# Patient Record
Sex: MALE | Race: WHITE | NOT HISPANIC OR LATINO
[De-identification: names, ages, dates, MRNs, and addresses within clinical notes are randomized per-mention and may not be internally consistent; named-entity substitution may affect disease eponyms.]

---

## 2018-07-12 ENCOUNTER — LABORATORY RESULT (OUTPATIENT)
Age: 48
End: 2018-07-12

## 2018-07-12 ENCOUNTER — APPOINTMENT (OUTPATIENT)
Dept: RHEUMATOLOGY | Facility: CLINIC | Age: 48
End: 2018-07-12
Payer: MEDICAID

## 2018-07-12 VITALS
TEMPERATURE: 98.7 F | HEIGHT: 71 IN | DIASTOLIC BLOOD PRESSURE: 100 MMHG | RESPIRATION RATE: 20 BRPM | HEART RATE: 20 BPM | SYSTOLIC BLOOD PRESSURE: 140 MMHG | OXYGEN SATURATION: 98 % | WEIGHT: 210 LBS | BODY MASS INDEX: 29.4 KG/M2

## 2018-07-12 DIAGNOSIS — Z87.438 PERSONAL HISTORY OF OTHER DISEASES OF MALE GENITAL ORGANS: ICD-10-CM

## 2018-07-12 DIAGNOSIS — M19.012 PRIMARY OSTEOARTHRITIS, RIGHT SHOULDER: ICD-10-CM

## 2018-07-12 DIAGNOSIS — Z87.09 PERSONAL HISTORY OF OTHER DISEASES OF THE RESPIRATORY SYSTEM: ICD-10-CM

## 2018-07-12 DIAGNOSIS — M19.011 PRIMARY OSTEOARTHRITIS, RIGHT SHOULDER: ICD-10-CM

## 2018-07-12 DIAGNOSIS — H10.10 ALLERGIC RHINITIS, UNSPECIFIED: ICD-10-CM

## 2018-07-12 DIAGNOSIS — Z88.9 ALLERGY STATUS TO UNSPECIFIED DRUGS, MEDICAMENTS AND BIOLOGICAL SUBSTANCES: ICD-10-CM

## 2018-07-12 DIAGNOSIS — J30.9 ALLERGIC RHINITIS, UNSPECIFIED: ICD-10-CM

## 2018-07-12 PROCEDURE — 36415 COLL VENOUS BLD VENIPUNCTURE: CPT

## 2018-07-12 PROCEDURE — 99245 OFF/OP CONSLTJ NEW/EST HI 55: CPT | Mod: 25

## 2018-07-12 RX ORDER — FLUTICASONE PROPIONATE 50 MCG
50 SPRAY, SUSPENSION NASAL
Refills: 0 | Status: ACTIVE | COMMUNITY

## 2018-07-12 RX ORDER — TAMSULOSIN HCL 0.4 MG
0.4 CAPSULE ORAL
Refills: 0 | Status: ACTIVE | COMMUNITY

## 2018-07-12 RX ORDER — LEVOCETIRIZINE DIHYDROCHLORIDE 5 MG/1
5 TABLET, FILM COATED ORAL
Refills: 0 | Status: ACTIVE | COMMUNITY

## 2018-07-12 RX ORDER — MONTELUKAST SODIUM 10 MG/1
10 TABLET, FILM COATED ORAL
Refills: 0 | Status: ACTIVE | COMMUNITY

## 2018-08-21 ENCOUNTER — RESULT REVIEW (OUTPATIENT)
Age: 48
End: 2018-08-21

## 2019-01-24 LAB
ALBUMIN MFR SERPL ELPH: 62.7 %
ALBUMIN SERPL-MCNC: 4.8 G/DL
ALBUMIN/GLOB SERPL: 1.7 RATIO
ALPHA1 GLOB MFR SERPL ELPH: 3.8 %
ALPHA1 GLOB SERPL ELPH-MCNC: 0.3 G/DL
ALPHA2 GLOB MFR SERPL ELPH: 8.8 %
ALPHA2 GLOB SERPL ELPH-MCNC: 0.7 G/DL
B-GLOBULIN MFR SERPL ELPH: 10.8 %
B-GLOBULIN SERPL ELPH-MCNC: 0.8 G/DL
CCP AB SER IA-ACNC: <8 UNITS
COLLAGEN TYPE II: 10.2 EU/ML
DEPRECATED KAPPA LC FREE/LAMBDA SER: 0.67 RATIO
GAMMA GLOB FLD ELPH-MCNC: 1.1 G/DL
GAMMA GLOB MFR SERPL ELPH: 13.9 %
IGA SER QL IEP: 306 MG/DL
IGG SER QL IEP: 1115 MG/DL
IGM SER QL IEP: 254 MG/DL
INTERPRETATION SERPL IEP-IMP: NORMAL
KAPPA LC CSF-MCNC: 2.15 MG/DL
KAPPA LC SERPL-MCNC: 1.44 MG/DL
M PROTEIN SPEC IFE-MCNC: NORMAL
PROT SERPL-MCNC: 7.7 G/DL
PROT SERPL-MCNC: 7.7 G/DL
R RICKETTSI IGG CSF-ACNC: POSITIVE
R RICKETTSI IGM CSF-ACNC: 0.85 INDEX
RF+CCP IGG SER-IMP: NEGATIVE

## 2019-03-20 ENCOUNTER — APPOINTMENT (OUTPATIENT)
Dept: UROLOGY | Facility: CLINIC | Age: 49
End: 2019-03-20
Payer: MEDICAID

## 2019-03-20 VITALS
HEART RATE: 64 BPM | HEIGHT: 71 IN | BODY MASS INDEX: 30.1 KG/M2 | SYSTOLIC BLOOD PRESSURE: 156 MMHG | WEIGHT: 215 LBS | DIASTOLIC BLOOD PRESSURE: 91 MMHG

## 2019-03-20 DIAGNOSIS — Z80.8 FAMILY HISTORY OF MALIGNANT NEOPLASM OF OTHER ORGANS OR SYSTEMS: ICD-10-CM

## 2019-03-20 DIAGNOSIS — Z80.42 FAMILY HISTORY OF MALIGNANT NEOPLASM OF PROSTATE: ICD-10-CM

## 2019-03-20 DIAGNOSIS — R35.1 NOCTURIA: ICD-10-CM

## 2019-03-20 PROCEDURE — 99204 OFFICE O/P NEW MOD 45 MIN: CPT

## 2019-03-20 RX ORDER — OMEPRAZOLE 40 MG/1
40 CAPSULE, DELAYED RELEASE ORAL
Qty: 90 | Refills: 0 | Status: ACTIVE | COMMUNITY
Start: 2018-11-12

## 2019-03-20 RX ORDER — ALPRAZOLAM 0.5 MG/1
0.5 TABLET ORAL
Qty: 30 | Refills: 0 | Status: ACTIVE | COMMUNITY
Start: 2019-03-06

## 2019-03-20 RX ORDER — ATORVASTATIN CALCIUM 10 MG/1
10 TABLET, FILM COATED ORAL
Qty: 90 | Refills: 0 | Status: ACTIVE | COMMUNITY
Start: 2019-02-18

## 2019-03-20 NOTE — PHYSICAL EXAM
[General Appearance - Well Developed] : well developed [General Appearance - Well Nourished] : well nourished [Normal Appearance] : normal appearance [Well Groomed] : well groomed [General Appearance - In No Acute Distress] : no acute distress [Edema] : no peripheral edema [Respiration, Rhythm And Depth] : normal respiratory rhythm and effort [Exaggerated Use Of Accessory Muscles For Inspiration] : no accessory muscle use [Bowel Sounds] : normal bowel sounds [Auscultation Breath Sounds / Voice Sounds] : lungs were clear to auscultation bilaterally [Abdomen Soft] : soft [Abdomen Tenderness] : non-tender [Abdomen Mass (___ Cm)] : no abdominal mass palpated [Costovertebral Angle Tenderness] : no ~M costovertebral angle tenderness [Penis Abnormality] : normal circumcised penis [Urethral Meatus] : meatus normal [Scrotum] : the scrotum was normal [Urinary Bladder Findings] : the bladder was normal on palpation [Epididymis] : the epididymides were normal [Testes Mass (___cm)] : there were no testicular masses [Testes Tenderness] : no tenderness of the testes [Rectal Exam - Rectum] : digital rectal exam was normal [Anus Abnormality] : the anus and perineum were normal [No Prostate Nodules] : no prostate nodules [Prostate Tenderness] : the prostate was not tender [Prostate Size ___ gm] : prostate size [unfilled] gm [Normal Station and Gait] : the gait and station were normal for the patient's age [No Focal Deficits] : no focal deficits [] : no rash [Oriented To Time, Place, And Person] : oriented to person, place, and time [Affect] : the affect was normal [Mood] : the mood was normal [Not Anxious] : not anxious [No Palpable Adenopathy] : no palpable adenopathy [FreeTextEntry1] : S1-S2 normal without murmur rub or gallop.

## 2019-03-20 NOTE — HISTORY OF PRESENT ILLNESS
[FreeTextEntry1] : The patient is a 48-year-old gentleman who was seen in the office today for the above. He said that his previous PCP treated him with Flomax for years ago because of post void dribbling. He is still taking this medication. His daytime frequency is 3 times a day with nocturia x2-4 as noted above. He denies all other urological and constitutional symptomatology. He admits to drinking three 24 ounce cups of water after dinner. He said that he likes to "keep hydrated".\par \par Past Urological History:\par A few UTIs\par -passed kidney stone\par -non-GC urethritis\par \par Urological Family History:\par Father- of prostate cancer\par Mother-nephrolithiasis

## 2019-03-20 NOTE — REVIEW OF SYSTEMS
[see HPI] : see HPI [Negative] : Heme/Lymph [Wake up at night to urinate  How many times?  ___] : wakes up to urinate [unfilled] times during the night [Strong urge to urinate] : strong urge to urinate [Interrupted urine stream] : interrupted urine stream [Wait a long time to urinate] : waits a long time to urinate [Leakage of urine with urgency] : leakage of urine with urgency

## 2019-03-20 NOTE — ASSESSMENT
[FreeTextEntry1] : #1) nocturia x2-4\par Patient is drinking an excessive amount of water (72 ounces) after dinner which can easily be the cause of this symptom.\par He was advised to eliminate all fluids after dinner and track his nocturia.\par \par #2) mild BPH\par The prostate on ROGER is minimally enlarged and it was recommended that he discontinue the Flomax to determine if he develops any significant urinary symptoms.\par \par He'll return in 2 months for reevaluation.

## 2019-05-22 ENCOUNTER — APPOINTMENT (OUTPATIENT)
Dept: UROLOGY | Facility: CLINIC | Age: 49
End: 2019-05-22

## 2019-10-17 ENCOUNTER — APPOINTMENT (OUTPATIENT)
Dept: ORTHOPEDIC SURGERY | Facility: CLINIC | Age: 49
End: 2019-10-17

## 2019-10-21 ENCOUNTER — APPOINTMENT (OUTPATIENT)
Dept: ORTHOPEDIC SURGERY | Facility: CLINIC | Age: 49
End: 2019-10-21
Payer: MEDICAID

## 2019-10-21 VITALS
DIASTOLIC BLOOD PRESSURE: 106 MMHG | BODY MASS INDEX: 30.1 KG/M2 | WEIGHT: 215 LBS | HEIGHT: 71 IN | HEART RATE: 87 BPM | SYSTOLIC BLOOD PRESSURE: 165 MMHG

## 2019-10-21 DIAGNOSIS — Z72.89 OTHER PROBLEMS RELATED TO LIFESTYLE: ICD-10-CM

## 2019-10-21 DIAGNOSIS — Z87.39 PERSONAL HISTORY OF OTHER DISEASES OF THE MUSCULOSKELETAL SYSTEM AND CONNECTIVE TISSUE: ICD-10-CM

## 2019-10-21 DIAGNOSIS — Z78.9 OTHER SPECIFIED HEALTH STATUS: ICD-10-CM

## 2019-10-21 PROCEDURE — 72100 X-RAY EXAM L-S SPINE 2/3 VWS: CPT | Mod: TC

## 2019-10-21 PROCEDURE — 99204 OFFICE O/P NEW MOD 45 MIN: CPT

## 2019-10-21 RX ORDER — METHYLPREDNISOLONE 4 MG/1
4 TABLET ORAL
Qty: 1 | Refills: 0 | Status: ACTIVE | COMMUNITY
Start: 2019-10-21 | End: 1900-01-01

## 2019-10-21 RX ORDER — TRAZODONE HYDROCHLORIDE 50 MG/1
50 TABLET ORAL
Refills: 0 | Status: ACTIVE | COMMUNITY

## 2019-10-21 RX ORDER — NAPROXEN 500 MG/1
500 TABLET ORAL
Qty: 60 | Refills: 0 | Status: ACTIVE | COMMUNITY
Start: 2019-10-21 | End: 1900-01-01

## 2019-10-21 NOTE — HISTORY OF PRESENT ILLNESS
[de-identified] : 50yo M PMHx DIONTE PIERRE presents with complaints of lumbago for last 3 months.  He reports he lifted a golfcart and felt a popping sensation to his spine.  He has intermittent daily dull pain which worsens with mechanical movements, standing, bending, lifting.  He notes having left LE sciatica intermittently accompanied by numbness and tingling.  He reports his pain improves with rest, heat and NSAID.  He was seen at [facility] in July provided with a prednisone with minimal improvement of his symptoms. He is not enrolled in PT.  No current chiropractor care.  [Pain Location] : pain [6] : a current pain level of 6/10 [Stable] : stable [Intermit.] : ~He/She~ states the symptoms seem to be intermittent [Lifting] : worsened by lifting [Bending] : worsened by bending [Standing] : worsened by standing [Heat] : relieved by heat [NSAIDs] : relieved by nonsteroidal anti-inflammatory drugs [Rest] : relieved by rest [Ataxia] : ataxia [Incontinence] : incontinence [Loss of Dexterity] : loss of dexterity [Urinary Ret.] : urinary retention

## 2019-10-21 NOTE — PHYSICAL EXAM
[de-identified] : Lumbar xray with lumbar spondylosis and L5 inferior endplate limbus. [de-identified] : Spine: CONSTITUTIONAL: The patient is a very pleasant individual who is well-nourished and who appears stated age.\par \par PSYCHIATRIC: The patient is alert and oriented X 3 and in no apparent distress.\par HEENT: Atraumatic and is nonsyndromic in appearance.\par \par RESPIRATORY: Respiratory rate is regular, No MCCARTHY\par \par LYMPHATICS: There is no inguinal lymphadenopathy\par \par INTEGUMENTARY: Skin is clean, dry, and intact about the bilateral lower extremities and lumbar spine.\par \par VASCULAR: There is brisk capillary refill about the bilateral lower extremities and dorsalis pedis pulses are 2/4. \par \par NEUROLOGIC: There are no pathologic reflexes. There is no decrease in sensation of the bilateral lower extremities on Wartenberg pinwheel examination. Deep tendon reflexes are well maintained at 2+/4 of the bilateral lower extremities and are symmetric. \par \par MUSCULOSKELETAL: There is no visible muscular atrophy. Manual motor strength is well maintained in the bilateral lower extremities. Range of motion of lumbar spine is well maintained. The patient ambulates in a non-myelopathic manner. Negative tension sign and straight leg raise bilaterally. Quad extension, ankle dorsiflexion, EHL, plantar flexion, and ankle eversion are well preserved. Normal secondary orthopaedic exam of bilateral piriformis, hips, greater trochanters, knees. axial back pain reproducible with palpation. Multiple trigger points of pain to lumbar parspinals. \par

## 2019-10-21 NOTE — REVIEW OF SYSTEMS
[Joint Pain] : joint pain [Joint Stiffness] : joint stiffness [Feeling Tired] : fatigue [Joint Swelling] : joint swelling [Nasal Discharge] : nasal discharge [Diarrhea] : diarrhea [Depression] : depression [Anxiety] : anxiety [Sleep Disturbances] : ~T sleep disturbances [Easy Bruising] : a tendency for easy bruising [Negative] : Psychiatric

## 2019-10-21 NOTE — DISCUSSION/SUMMARY
[de-identified] : 49y M presents with Lumbar spondylosis and intermittent LLE sciatica.  Will initiate conservative management with physical therapy for core strengthening modalities, decreased pain modalities and medical massage. Patient was provided a prescription of Medrol dose pack, naproxen 500 p.o. q.12 p.r.n. pain. Discussed home exercise to strengthen core such as yoga Pilates swimming walking.  Pt had an MRI ordered by PCP but denied due to lack conservative management.  Pt will return to office in 4-6wks after PT if symptoms not improving will consider further imaging. \par

## 2019-11-18 ENCOUNTER — APPOINTMENT (OUTPATIENT)
Dept: ORTHOPEDIC SURGERY | Facility: CLINIC | Age: 49
End: 2019-11-18
Payer: MEDICAID

## 2019-11-18 VITALS
BODY MASS INDEX: 30.1 KG/M2 | WEIGHT: 215 LBS | SYSTOLIC BLOOD PRESSURE: 159 MMHG | DIASTOLIC BLOOD PRESSURE: 95 MMHG | HEART RATE: 109 BPM | HEIGHT: 71 IN

## 2019-11-18 PROCEDURE — 99213 OFFICE O/P EST LOW 20 MIN: CPT

## 2019-11-18 RX ORDER — MELOXICAM 15 MG/1
15 TABLET ORAL
Qty: 30 | Refills: 0 | Status: ACTIVE | COMMUNITY
Start: 2019-11-18 | End: 1900-01-01

## 2019-11-18 NOTE — PHYSICAL EXAM
[de-identified] :  CONSTITUTIONAL: The patient is a very pleasant individual who is well-nourished and who appears stated age.\par \par PSYCHIATRIC: The patient is alert and oriented X 3 and in no apparent distress.\par HEENT: Atraumatic and is nonsyndromic in appearance.\par \par RESPIRATORY: Respiratory rate is regular, No MCCARTHY\par \par LYMPHATICS: There is no inguinal lymphadenopathy\par \par INTEGUMENTARY: Skin is clean, dry, and intact about the bilateral lower extremities and lumbar spine.\par \par VASCULAR: There is brisk capillary refill about the bilateral lower extremities and dorsalis pedis pulses are 2/4. \par \par NEUROLOGIC: There are no pathologic reflexes. There is no decrease in sensation of the bilateral lower extremities on Wartenberg pinwheel examination. Deep tendon reflexes are well maintained at 2+/4 of the bilateral lower extremities and are symmetric. \par \par MUSCULOSKELETAL: There is no visible muscular atrophy. Manual motor strength is well maintained in the bilateral lower extremities. Range of motion of lumbar spine is well maintained. The patient ambulates in a non-myelopathic manner. Negative tension sign and straight leg raise bilaterally. Quad extension, ankle dorsiflexion, EHL, plantar flexion, and ankle eversion are well preserved. Normal secondary orthopaedic exam of bilateral piriformis, hips, greater trochanters, knees. axial back pain reproducible with palpation. M [de-identified] : No new imaging

## 2019-11-18 NOTE — HISTORY OF PRESENT ILLNESS
[de-identified] : 48yo M presents with complaints of lumbago which is worsening since last visit.   He reports daily dull pain which worsens with mechanical movements, standing, bending, lifting.  He still has persistent intermittent left LE sciatica  and numbness and tingling.  He reports his pain improves with rest, heat and NSAID.  He reports medrol dose pack did not provide any relief of his symptoms.  He states unable to start PT due to insurance.  [Pain Location] : pain [Worsening] : worsening [8] : a current pain level of 8/10 [Daily] : ~He/She~ states the symptoms seem to be occuring daily [Bending] : worsened by bending [Lifting] : worsened by lifting [NSAIDs] : relieved by nonsteroidal anti-inflammatory drugs [Rest] : relieved by rest [Urinary Ret.] : no urinary retention [Incontinence] : no incontinence

## 2019-11-18 NOTE — DISCUSSION/SUMMARY
[de-identified] : 49y M presents with Lumbar spondylosis with LLE sciatica. He can continue NSAID as needed.  Continue with conservative management. MRI of lumbar spine to evaluate for discogenic cause of LLE sciatica. f/u after MRI is complete.

## 2019-11-25 ENCOUNTER — APPOINTMENT (OUTPATIENT)
Dept: INTERNAL MEDICINE | Facility: CLINIC | Age: 49
End: 2019-11-25
Payer: MEDICAID

## 2019-11-25 VITALS
DIASTOLIC BLOOD PRESSURE: 80 MMHG | HEIGHT: 71 IN | BODY MASS INDEX: 29.54 KG/M2 | WEIGHT: 211 LBS | SYSTOLIC BLOOD PRESSURE: 120 MMHG

## 2019-11-25 DIAGNOSIS — A77.0 SPOTTED FEVER DUE TO RICKETTSIA RICKETTSII: ICD-10-CM

## 2019-11-25 PROCEDURE — 36415 COLL VENOUS BLD VENIPUNCTURE: CPT

## 2019-11-25 PROCEDURE — 99204 OFFICE O/P NEW MOD 45 MIN: CPT | Mod: 25

## 2019-11-25 RX ORDER — TAMSULOSIN HYDROCHLORIDE 0.4 MG/1
0.4 CAPSULE ORAL
Qty: 90 | Refills: 0 | Status: DISCONTINUED | COMMUNITY
Start: 2019-01-21 | End: 2019-11-25

## 2019-11-25 RX ORDER — LORATADINE 10 MG/1
10 TABLET ORAL
Qty: 30 | Refills: 0 | Status: DISCONTINUED | COMMUNITY
Start: 2018-11-12 | End: 2019-11-25

## 2019-11-25 RX ORDER — OMEPRAZOLE MAGNESIUM 20 MG/1
TABLET, DELAYED RELEASE ORAL
Refills: 0 | Status: DISCONTINUED | COMMUNITY
End: 2019-11-25

## 2019-11-25 RX ORDER — PREDNISONE 20 MG/1
20 TABLET ORAL
Qty: 10 | Refills: 0 | Status: DISCONTINUED | COMMUNITY
Start: 2018-12-22 | End: 2019-11-25

## 2019-11-25 RX ORDER — ALPRAZOLAM 2 MG/1
TABLET ORAL
Refills: 0 | Status: DISCONTINUED | COMMUNITY
End: 2019-11-25

## 2019-11-25 RX ORDER — RANITIDINE 150 MG/1
150 TABLET ORAL
Qty: 30 | Refills: 0 | Status: DISCONTINUED | COMMUNITY
Start: 2019-03-06 | End: 2019-11-25

## 2019-11-25 RX ORDER — AMOXICILLIN AND CLAVULANATE POTASSIUM 875; 125 MG/1; MG/1
875-125 TABLET, COATED ORAL
Qty: 20 | Refills: 0 | Status: DISCONTINUED | COMMUNITY
Start: 2018-12-22 | End: 2019-11-25

## 2019-11-25 NOTE — ASSESSMENT
[Treatment Education] : treatment education [FreeTextEntry1] : Patient has no evidence of any tick-related illness as it appears his pos RMSF serology absence of symptoms is consistent with probable disease in the past. Noted to the patient that it may remain positive indefinitely and this is reflective of old disease as it does not cause chronic illness. Patient's symptomatology is also not consistent with a recommend spotted fever as it does not cause chronic symptoms.\par It is unclear if the patient's fatigue is work related or whether he has occult sleep apnea and for that patient should probably repeat referred to pulmonary for a formal sleep study.\par Although patient is gamble he has not had any risky behavior but he is unaware of hep C status a repeat HIV testing and hep C testing will also be done.\par I reassured the patient has no evidence of any infectious disease and no further evaluation is necessary assuming blood work done today is negative\par I cautioned patient against routine ticc serology in the absence of symptoms [Treatment Adherence] : treatment adherence [Risk Reduction] : risk reduction [Rx Dose / Side Effects] : Rx dose/side effects [Drug Interactions / Side Effects] : drug interactions/side effects

## 2019-11-25 NOTE — ASSESSMENT
[Treatment Education] : treatment education [FreeTextEntry1] : Patient has no evidence of any tick-related illness as it appears his pos RMSF serology absence of symptoms is consistent with probable disease in the past. Noted to the patient that it may remain positive indefinitely and this is reflective of old disease as it does not cause chronic illness. 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

## 2019-11-25 NOTE — HISTORY OF PRESENT ILLNESS
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 Patient stated that for the last number years every time his chest and he has been positive. He has known memory of any acute febrile episode. Is from [place] currently lives in both [place] and [place]. He has no history of rashes headaches or febrile illnesses. When he was initially diagnosed based on positive serology in the setting of fatigue and achiness he was given a course of Vibramycin. Subsequently he has had blood test done multiple times still positive for atherosclerotic fever.\par He has no associated symptoms except for fatigue and nonspecific joint symptoms\par He is gamble but states he is essentially celibate although has a partner for the last 8 years. He states he used last time he was HIV tested was within the past year. He is unaware of hep C testing\par Patient comes a lab test that revealed negative serologies for tick-related illness as well as for rheumatoid arthritis.\par He is unaware of a history of snoring

## 2019-11-25 NOTE — REVIEW OF SYSTEMS
[As Noted in HPI] : as noted in HPI [Feeling Tired] : feeling tired [Joint Pain] : no joint pain [Negative] : Heme/Lymph

## 2019-11-25 NOTE — PHYSICAL EXAM
[General Appearance - In No Acute Distress] : in no acute distress [Sclera] : the sclera and conjunctiva were normal [General Appearance - Alert] : alert [Extraocular Movements] : extraocular movements were intact [PERRL With Normal Accommodation] : pupils were equal in size, round, reactive to light [Outer Ear] : the ears and nose were normal in appearance [Neck Cervical Mass (___cm)] : no neck mass was observed [Oropharynx] : the oropharynx was normal with no thrush [Neck Appearance] : the appearance of the neck was normal [Jugular Venous Distention Increased] : there was no jugular-venous distention [Auscultation Breath Sounds / Voice Sounds] : lungs were clear to auscultation bilaterally [Thyroid Diffuse Enlargement] : the thyroid was not enlarged [Heart Rate And Rhythm] : heart rate was normal and rhythm regular [Heart Sounds Gallop] : no gallops [Heart Sounds] : normal S1 and S2 [Heart Sounds Pericardial Friction Rub] : no pericardial rub [Murmurs] : no murmurs [Bowel Sounds] : normal bowel sounds [] : no hepato-splenomegaly [Abdomen Soft] : soft [Abdomen Tenderness] : non-tender [No Palpable Adenopathy] : no palpable adenopathy [Costovertebral Angle Tenderness] : no CVA tenderness [Abdomen Mass (___ Cm)] : no abdominal mass palpated [Musculoskeletal - Swelling] : no joint swelling [Oriented To Time, Place, And Person] : oriented to person, place, and time [Motor Tone] : muscle strength and tone were normal [Nail Clubbing] : no clubbing  or cyanosis of the fingernails [Affect] : the affect was normal

## 2019-11-25 NOTE — PHYSICAL EXAM
[General Appearance - Alert] : alert [Sclera] : the sclera and conjunctiva were normal [General Appearance - In No Acute Distress] : in no acute distress [Outer Ear] : the ears and nose were normal in appearance [Extraocular Movements] : extraocular movements were intact [PERRL With Normal Accommodation] : pupils were equal in size, round, reactive to light [Neck Appearance] : the appearance of the neck was normal [Jugular Venous Distention Increased] : there was no jugular-venous distention [Oropharynx] : the oropharynx was normal with no thrush [Neck Cervical Mass (___cm)] : no neck mass was observed [Thyroid Diffuse Enlargement] : the thyroid was not enlarged [Auscultation Breath Sounds / Voice Sounds] : lungs were clear to auscultation bilaterally [Heart Sounds] : normal S1 and S2 [Heart Sounds Gallop] : no gallops [Heart Rate And Rhythm] : heart rate was normal and rhythm regular [Bowel Sounds] : normal bowel sounds [Heart Sounds Pericardial Friction Rub] : no pericardial rub [Murmurs] : no murmurs [Abdomen Soft] : soft [Abdomen Tenderness] : non-tender [] : no hepato-splenomegaly [Costovertebral Angle Tenderness] : no CVA tenderness [No Palpable Adenopathy] : no palpable adenopathy [Abdomen Mass (___ Cm)] : no abdominal mass palpated [Motor Tone] : muscle strength and tone were normal [Oriented To Time, Place, And Person] : oriented to person, place, and time [Nail Clubbing] : no clubbing  or cyanosis of the fingernails [Musculoskeletal - Swelling] : no joint swelling [Affect] : the affect was normal

## 2019-11-25 NOTE — HISTORY OF PRESENT ILLNESS
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 Patient stated that for the last number years every time his chest and he has been positive. He has known memory of any acute febrile episode. Is from [place] currently lives in both [place]. He has no history of rashes headaches or febrile illnesses. When he was initially diagnosed based on positive serology in the setting of fatigue and achiness he was given a course of Vibramycin. Subsequently he has had blood test done multiple times still positive for atherosclerotic fever.\par He has no associated symptoms except for fatigue and nonspecific joint symptoms\par He is gamble but states he is essentially celibate although has a partner for the last 8 years. He states he used last time he was HIV tested was within the past year. He is unaware of hep C testing\par Patient comes a lab test that revealed negative serologies for tick-related illness as well as for rheumatoid arthritis.\par He is unaware of a history of snoring

## 2019-11-26 LAB
HCV AB SER QL: NONREACTIVE
HCV S/CO RATIO: 0.1 S/CO
HIV1+2 AB SPEC QL IA.RAPID: NONREACTIVE

## 2019-11-28 ENCOUNTER — FORM ENCOUNTER (OUTPATIENT)
Age: 49
End: 2019-11-28

## 2019-11-29 ENCOUNTER — OUTPATIENT (OUTPATIENT)
Dept: OUTPATIENT SERVICES | Facility: HOSPITAL | Age: 49
LOS: 1 days | End: 2019-11-29
Payer: MEDICAID

## 2019-11-29 ENCOUNTER — APPOINTMENT (OUTPATIENT)
Dept: MRI IMAGING | Facility: CLINIC | Age: 49
End: 2019-11-29
Payer: MEDICAID

## 2019-11-29 DIAGNOSIS — M54.16 RADICULOPATHY, LUMBAR REGION: ICD-10-CM

## 2019-11-29 PROCEDURE — 72148 MRI LUMBAR SPINE W/O DYE: CPT

## 2019-11-29 PROCEDURE — 72148 MRI LUMBAR SPINE W/O DYE: CPT | Mod: 26

## 2019-12-11 ENCOUNTER — OTHER (OUTPATIENT)
Age: 49
End: 2019-12-11

## 2020-03-12 ENCOUNTER — APPOINTMENT (OUTPATIENT)
Dept: RADIOLOGY | Facility: CLINIC | Age: 50
End: 2020-03-12
Payer: MEDICAID

## 2020-03-12 ENCOUNTER — OUTPATIENT (OUTPATIENT)
Dept: OUTPATIENT SERVICES | Facility: HOSPITAL | Age: 50
LOS: 1 days | End: 2020-03-12
Payer: MEDICAID

## 2020-03-12 DIAGNOSIS — Z00.8 ENCOUNTER FOR OTHER GENERAL EXAMINATION: ICD-10-CM

## 2020-03-12 PROCEDURE — 72040 X-RAY EXAM NECK SPINE 2-3 VW: CPT | Mod: 26

## 2020-03-12 PROCEDURE — 73030 X-RAY EXAM OF SHOULDER: CPT | Mod: 26,RT

## 2020-03-12 PROCEDURE — 73080 X-RAY EXAM OF ELBOW: CPT | Mod: 26,RT

## 2020-03-12 PROCEDURE — 73030 X-RAY EXAM OF SHOULDER: CPT

## 2020-03-12 PROCEDURE — 73080 X-RAY EXAM OF ELBOW: CPT

## 2020-03-12 PROCEDURE — 72040 X-RAY EXAM NECK SPINE 2-3 VW: CPT

## 2020-04-13 ENCOUNTER — APPOINTMENT (OUTPATIENT)
Dept: ORTHOPEDIC SURGERY | Facility: CLINIC | Age: 50
End: 2020-04-13

## 2020-05-04 ENCOUNTER — APPOINTMENT (OUTPATIENT)
Dept: ORTHOPEDIC SURGERY | Facility: CLINIC | Age: 50
End: 2020-05-04
Payer: MEDICAID

## 2020-05-04 VITALS
BODY MASS INDEX: 29.4 KG/M2 | HEIGHT: 71 IN | SYSTOLIC BLOOD PRESSURE: 159 MMHG | WEIGHT: 210 LBS | DIASTOLIC BLOOD PRESSURE: 113 MMHG | HEART RATE: 87 BPM

## 2020-05-04 PROCEDURE — 99214 OFFICE O/P EST MOD 30 MIN: CPT | Mod: 25

## 2020-05-04 PROCEDURE — 20610 DRAIN/INJ JOINT/BURSA W/O US: CPT | Mod: RT

## 2020-05-04 PROCEDURE — 73030 X-RAY EXAM OF SHOULDER: CPT | Mod: RT

## 2020-05-04 PROCEDURE — 73080 X-RAY EXAM OF ELBOW: CPT | Mod: RT

## 2020-05-04 RX ORDER — MELOXICAM 7.5 MG/1
7.5 TABLET ORAL
Qty: 21 | Refills: 0 | Status: COMPLETED | COMMUNITY
Start: 2020-05-04 | End: 2020-05-25

## 2020-05-04 NOTE — DISCUSSION/SUMMARY
[de-identified] : AMARIS is a 49 year old male who presents today for initial evaluation of right elbow secondary to lateral epicondylitis and acute onset right shoulder pain secondary to calcific tendinitis with strain to the cervical portion of his trapezius. \par \par In regards to his elbow, I discussed with the patient the treatment of lateral epicondylitis. I discussed that this is a degenerative condition rather than an inflammatory process. The process is reversible, and the best source of treatment is to reduce the offending repetitive overuse injury process. I counseled the patient how to do this. In addition, treatment includes counterforce bracing, physical therapy for stretching and strengthening, and the use of injection therapy (steroids/PRP etc). I also discussed the role of surgical intervention for may become a chronic condition. \par \par In regards to his shoulder, radiographs show a calcification at the rotator cuff insertion. Clinically, the patient specific point tenderness to this location as well as positive impingement signs. I discussed the pathophysiology of the diagnosis and the distinction between the resorptive and formative phase.  I discussed the treatment of calcific tendinitis with the patient at length today and the inflammatory process it incites during resorptive phase.\par \par At this time a continued trial of  conservative measures is indicated with use of a neoprene elbow compression band, formal physical therapy to both the right elbow and shoulder, and a prescription for meloxicam once daily, consecutively for 21 days, with food and alongside use of his Prilosec daily as previously advised. I counseled the patient on the side effects of meloxicam and the possible negative sequelae.. If he does develop any acid reflux or abdominal pain they should stop the medication and then call us. Due to his acute shoulder pain and calcium deposit on radiographs, pt elects for a cortisone injection that he tolerated well. He denies allergies or diabetes. We'll see him back in 6-8 weeks for clinical reassessment. If at that time he is doing well we will see him in the future on an prn basis. It was also recommended to him today to see a spine surgeon for his chronic back issues with associated radiculopathy. He agrees with the above plan all questions were answered.\par \par

## 2020-05-04 NOTE — HISTORY OF PRESENT ILLNESS
[5] : a current pain level of 5/10 [Heat] : relieved by heat [NSAIDs] : relieved by nonsteroidal anti-inflammatory drugs [Ice] : relieved by ice [Rest] : relieved by rest [4] : an average pain level of 4/10 [1] : a minimum pain level of 1/10 [7] : a maximum pain level of 7/10 [Lifting] : lifting [de-identified] : AMARIS is a 49 year old male who presents today for initial evaluation of right elbow and right shoulder pain.  The pain began December 2019.  It is sharp, burning, shooting, and stabbing in nature.  Patient admits to seeing a chiropractor but denies any other treatments. He admits to a fall catching himself with both arms extended in front of him summer 2019 but doesn’t admit to pain in his shoulder at that time. He also admits to "pulling his back out," over the summer lifting a golf cart. He has associated neck pain on the right side as well since then. He has seen chiropractor and acupuncture in the past with minimal relief but not a spine surgeon. PCP gave him a "powerful ibuprofen shot" that lasted one day. Ice/heat/ tylenol/ oral NSAIDs give no relief either. Pt admits to a hx of GERD that he takes Prilosec for with no symptoms with use of NSAIDs while on it.

## 2020-05-04 NOTE — PHYSICAL EXAM
[de-identified] : Physical Exam:\par General: Well appearing, no acute distress\par Neurologic: A&Ox3, No focal deficits\par Head: NCAT without abrasions, lacerations, or ecchymosis to head, face, or scalp\par Eyes: No scleral icterus, no gross abnormalities\par Respiratory: Equal chest wall expansion bilaterally, no accessory muscle use\par Lymphatic: No lymphadenopathy palpated\par Skin: Warm and dry\par Psychiatric: Normal mood and affect\par \par Upper trapezius and cervical paraspinal muscle spasm\par \par Right Shoulder\par ·	Inspection/Palpation: Tenderness to palpation over supraspinatus insertion, no crepitus, no deformities\par ·	Range of Motion: no crepitus with ROM; Active ; ER at side 35; IR to L1; Passive  with pain, ER at side 45, IR to T7\par ·	Strength: forward elevation in scapular plane [4/5], internal rotation [4/5], external rotation [4/5], adduction [4/5] and abduction [4/5]\par ·	Stability: no joint instability on provocative testing\par ·	Tests: Lao test positive, Neer positive, positive drop arm test secondary to pain, bear hug test positive, Napolean sign POS, cross arm adduction positive, lift off sign positive, hornblowers sign POS, speeds test negative, Yergason's test negative, Davidson's Active Compression test negative, whipple test positive, bicep's load II test negative\par \par Left Shoulder\par ·	Inspection/Palpation: no tenderness, swelling or deformities\par ·	Range of Motion: full and painless in all planes, no crepitus\par ·	Strength: forward elevation in scapular plane 5/5, internal rotation 5/5, external rotation 5/5, adduction 5/5 and abduction 5/5\par ·	Stability: no joint instability on provocative testing\par Tests: Lao test negative, Neer sign negative, negative drop arm test secondary to pain, bear hug test negative, Napolean sign negative, cross arm adduction negative, lift off sign positive, hornblowers sign negative, speeds test negative, Yergason's test negative, no bicipital groove tenderness, Davidson's Active Compression test negative\par \par Elbow Exam\par \par Skin: Clean, dry, intact. No ecchymosis. No swelling. No palpable joint effusion.\par ROM: RIGHT 0-130, full supination/pronation.  LEFT 0-140, full supination/pronation.\par Painful ROM: Pronation to lateral right elbow\par Tenderness: [No] medial epicondyle pain. Lateral epicondyle pain. [No] olecranon pain. No pain at radial head.\par Strength: 5/5 elbow flexion, 5/5 elbow extension, 5/5 supination, 5/5 pronation\par Stability: Stable to vaus/valgus stress\par Vasc: 2+ radial pulse, <2s cap refill\par Sensation: In tact to light touch throughout\par Neuro: Negative tinels at ulnar canal, AIN/PIN/Ulnar nerve in tact to motor/sensation.\par \par Special Tests:\par Dorsiflexion Against Resistance: Negative\par Flexion of Wrist Against Resistance: POS\par Resistance Against Pronation: POS\par Resistance Against Supination: Negative\par Tinel's Sign Cubital Tunnel: Negative [de-identified] : The following radiographs were ordered and read by me during this patients visit. I reviewed each radiograph in detail with the patient and discussed the findings as highlighted below. \par \par 4 views of the right shoulder shows calcium deposit at supraspinatus insertion, no fracture, dislocation or bony lesions. There is evidence of degenerative change in the glenohumeral and acromioclavicular joints. Otherwise unremarkable.\par \par 3 views of the right elbow were performed today and available for me to review. No fracture. No dislocation. No other deformities.

## 2020-05-04 NOTE — REVIEW OF SYSTEMS
[Joint Pain] : joint pain [Joint Stiffness] : joint stiffness [Recent Weight Gain (___ Lbs)] : recent ~M [unfilled] lbs weight gain [Depression] : depression [Anxiety] : anxiety [Sleep Disturbances] : ~T sleep disturbances [FreeTextEntry9] : right shoulder and elbow

## 2020-05-04 NOTE — PROCEDURE
[de-identified] : Injection: Right shoulder (Subacromial).\par Indication: Calcific Tendonitis\par \par A discussion was had with the patient regarding this procedure and all questions were answered. All risks, benefits and alternatives were discussed. These included but were not limited to bleeding, infection, and allergic reaction. Alcohol was used to clean the skin, and betadine was used to sterilize and prep the area in the posterior aspect of the right shoulder. Ethyl chloride spray was then used as a topical anesthetic. A 22-gauge needle was used to inject 3cc 1% xylocaine, 2cc 0.25% bupivacaine and 1cc of 40mg/mL triamcinolone acetonide into the right subacromial space. A sterile bandage was then applied. The patient tolerated the procedure well and there were no complications.\par

## 2020-05-15 ENCOUNTER — NON-APPOINTMENT (OUTPATIENT)
Age: 50
End: 2020-05-15

## 2020-09-03 ENCOUNTER — APPOINTMENT (OUTPATIENT)
Dept: ORTHOPEDIC SURGERY | Facility: CLINIC | Age: 50
End: 2020-09-03

## 2020-11-05 ENCOUNTER — APPOINTMENT (OUTPATIENT)
Dept: ORTHOPEDIC SURGERY | Facility: CLINIC | Age: 50
End: 2020-11-05
Payer: MEDICAID

## 2020-11-05 VITALS — TEMPERATURE: 97.8 F

## 2020-11-05 VITALS
BODY MASS INDEX: 27.3 KG/M2 | HEART RATE: 106 BPM | DIASTOLIC BLOOD PRESSURE: 93 MMHG | WEIGHT: 195 LBS | SYSTOLIC BLOOD PRESSURE: 142 MMHG | HEIGHT: 71 IN

## 2020-11-05 DIAGNOSIS — Z86.59 PERSONAL HISTORY OF OTHER MENTAL AND BEHAVIORAL DISORDERS: ICD-10-CM

## 2020-11-05 DIAGNOSIS — Z87.19 PERSONAL HISTORY OF OTHER DISEASES OF THE DIGESTIVE SYSTEM: ICD-10-CM

## 2020-11-05 DIAGNOSIS — M47.816 SPONDYLOSIS W/OUT MYELOPATHY OR RADICULOPATHY, LUMBAR REGION: ICD-10-CM

## 2020-11-05 DIAGNOSIS — Z87.39 PERSONAL HISTORY OF OTHER DISEASES OF THE MUSCULOSKELETAL SYSTEM AND CONNECTIVE TISSUE: ICD-10-CM

## 2020-11-05 DIAGNOSIS — Z86.39 PERSONAL HISTORY OF OTHER ENDOCRINE, NUTRITIONAL AND METABOLIC DISEASE: ICD-10-CM

## 2020-11-05 DIAGNOSIS — Z84.1 FAMILY HISTORY OF DISORDERS OF KIDNEY AND URETER: ICD-10-CM

## 2020-11-05 PROCEDURE — 72100 X-RAY EXAM L-S SPINE 2/3 VWS: CPT

## 2020-11-05 PROCEDURE — 99072 ADDL SUPL MATRL&STAF TM PHE: CPT

## 2020-11-05 PROCEDURE — 99214 OFFICE O/P EST MOD 30 MIN: CPT

## 2020-11-05 RX ORDER — METHYLPREDNISOLONE 4 MG/1
4 TABLET ORAL
Qty: 1 | Refills: 0 | Status: ACTIVE | COMMUNITY
Start: 2020-11-05 | End: 1900-01-01

## 2020-11-05 NOTE — HISTORY OF PRESENT ILLNESS
[de-identified] : 50 year old  M Presents for evaluation of lower back pain he describes as a constant dull pressure he isolates to the lower left lumbar spine, this pain radiates down his LLE. He attended PT for 2-3 months twice a week previously which he responded well to. Patient states he is a  on [place] and is there for long periods of time working most days so it is difficult for him to seek care consistently. He states he had an incident recently where he slipped on a board walk and landed flat on his back. BRANDY questionnaire negative.   [Ataxia] : no ataxia [Incontinence] : no incontinence [Loss of Dexterity] : good dexterity [Urinary Ret.] : no urinary retention

## 2020-11-05 NOTE — DISCUSSION/SUMMARY
[de-identified] : Patient was instructed to start home exercises, core strengthening and a sheet was provided. Patient has been referred to physical therapy for decreased pain modalities, core strengthening modalities, soft tissue modalities, and physical modalities. Patient has been referred to physiatry for assessment regarding injections to help manage pain. I will provide a Medrol dose pack for pain relief. Patient to follow up on a PRN basis.

## 2020-11-05 NOTE — ADDENDUM
[FreeTextEntry1] : Documented by Tyler Bobby acting as a scribe for Dr. Pro Vee on 11/05/2020. All medical record entries made by the Scribe were at my, Dr. Pro Vee, direction and personally dictated by me on 11/05/2020 . I have reviewed the chart and agree that the record accurately reflects my personal performance of the history, physical exam, assessment and plan. I have also personally directed, reviewed, and agreed with the chart.

## 2020-11-05 NOTE — PHYSICAL EXAM
[Poor Appearance] : well-appearing [Acute Distress] : not in acute distress [Obese] : not obese [de-identified] : CONSTITUTIONAL: The patient is a very pleasant individual who is well-nourished and who appears stated age.\par PSYCHIATRIC: The patient is alert and oriented X 3 and in no apparent distress, and participates with orthopedic evaluation well.\par HEAD: Atraumatic and is nonsyndromic in appearance.\par EENT: No visible thyromegaly, EOMI.\par RESPIRATORY: Respiratory rate is regular, not dyspneic on examination.\par LYMPHATICS: There is no inguinal lymphadenopathy\par INTEGUMENTARY: Skin is clean, dry, and intact about the bilateral lower extremities and lumbar spine.\par VASCULAR: There is brisk capillary refill about the bilateral lower extremities.\par NEUROLOGIC: There are no pathologic reflexes. Deep tendon reflexes are well maintained at 2+/4 of the bilateral lower extremities and are symmetric. Subjective complaints of LLE radiculopathy in an L4 distribution. \par MUSCULOSKELETAL: There is no visible muscular atrophy. Manual motor strength is well maintained in the bilateral lower extremities. Range of motion of lumbar spine is well maintained. The patient ambulates in a non-myelopathic manner. Negative tension sign and straight leg raise bilaterally. Quad extension, ankle dorsiflexion, EHL, plantar flexion, and ankle eversion are well preserved. Normal secondary orthopaedic exam of bilateral hips, greater trochanteric area, knees and ankles Trigger point finding in the left lower lumbar spine.  [de-identified] : MRI of the lumbar spine taken at [facility] on 11/29/2019  demonstrates age appropriate lumbar spondylosis, chronic findings at L5 inferiorly which may be consistent with a limbus \par \par Xray of the Lumbar spine taken today: AP projection shows pedicles are well visualized L1-L5, no rotoscoliosis, lateral projections show well maintained lumbar lordosis with no acute processes noted. Chronic change of the anterior inferior corner of L5. Mild age appropriate degenerative changes appreciated.

## 2021-07-20 ENCOUNTER — APPOINTMENT (OUTPATIENT)
Dept: ORTHOPEDIC SURGERY | Facility: CLINIC | Age: 51
End: 2021-07-20
Payer: MEDICAID

## 2021-08-05 ENCOUNTER — APPOINTMENT (OUTPATIENT)
Dept: ORTHOPEDIC SURGERY | Facility: CLINIC | Age: 51
End: 2021-08-05
Payer: MEDICAID

## 2021-08-05 ENCOUNTER — NON-APPOINTMENT (OUTPATIENT)
Age: 51
End: 2021-08-05

## 2021-08-05 PROCEDURE — 20610 DRAIN/INJ JOINT/BURSA W/O US: CPT | Mod: RT

## 2021-08-05 PROCEDURE — 73564 X-RAY EXAM KNEE 4 OR MORE: CPT | Mod: LT

## 2021-08-05 PROCEDURE — 99214 OFFICE O/P EST MOD 30 MIN: CPT | Mod: 25

## 2021-08-05 NOTE — HISTORY OF PRESENT ILLNESS
[Stable] : stable [___ mths] : [unfilled] month(s) ago [de-identified] : AMARIS FERNÁNDEZ is a 50 year male being seen for initial visit bilat knee pain, L > R. He states symptoms started in late may. He visited [facility] where they xray were normal. He reports most pain in lateral compartment of knee. Patient denies mechanical symptoms such as catching, locking or buckling of the knee. He denies pain or numbness radiating down to extremity.

## 2021-08-05 NOTE — DISCUSSION/SUMMARY
[de-identified] : AMARIS FERNÁNDEZ is a 50 year male being seen for initial visit bilat knee pain, R secondary to IT band tendinitis, and L secondary to compensatory pain. He states symptoms started in late may. He visited [facility] where they xray were normal. He reports most pain in lateral compartment of knee. Patient denies mechanical symptoms such as catching, locking or buckling of the knee. He denies pain or numbness radiating down to extremity. \par \par We had a thorough discussion regarding the nature of his pain, the pathophysiology, as well as all treatment options. Normal radiographs were performed and reviewed today. Based off of his symptoms of pain with weakness and no prior conservative measures tried, patient necessitates physical therapy, and cortisone injection. Pt due to his acute pain elected for a right knee corticosteroid injection at today's visit and tolerated the procedure well. He should take it easy for the next 2-3 days while icing the joint. Patient was given prescription of formal physical therapy that he will perform 2x/wk for 6-8 wks. Conservative measures of treatment include rest until asymptomatic, activity avoidance, NSAID's PRN, application to ice to the area 2-3x daily for 20 minutes, with gradual return to activities. Patient will follow up in 4-6 wks for repeat clinical assessment. If refractory, we will consider MRI to explore further treatment options.  All questions were answered and the patient verbalized understanding. The patient is in agreement with this treatment plan.  \par

## 2021-08-05 NOTE — PHYSICAL EXAM
[de-identified] : Right Knee: Range of Motion in Degrees	\par 	  Claimant:	Normal:	\par Flexion Active	 115 	 135-degrees	\par Flexion Passive	 120	 135-degrees	\par Extension Active	 0-5	 0-5-degrees	\par Extension Passive	 0-5	 0-5-degrees	\par \par Moderate effusion. IT band TTP. Lateral joint line TTP.  No weakness to extension. No evidence of instability in the AP plane or varus or valgus stress. Negative Lachman. Negative pivot shift. Negative anterior drawer test. Negative posterior drawer test. Negative Joanne. Negative Apley grind. No medial joint line tenderness. No tenderness over the medial and lateral facet of the patella. No patellofemoral crepitations. No lateral tilting patella. No patellar apprehension. No crepitation in the medial and lateral femoral condyle. No proximal or distal swelling, edema or tenderness. No gross motor or sensory deficits. No intra-articular swelling. 2+ DP and PT pulses. No varus or valgus malalignment. Skin is intact. No rashes, scars or lesions.\par \par Left Knee: Range of Motion in Degrees	\par 	  Claimant:	Normal:	\par Flexion Active	 135 	 135-degrees	\par Flexion Passive	 135	 135-degrees	\par Extension Active	 0-5	 0-5-degrees	\par Extension Passive	 0-5	 0-5-degrees	\par \par No weakness to flexion/extension. No evidence of instability in the AP plane or varus or valgus stress. Negative Lachman. Negative pivot shift. Negative anterior drawer test. Negative posterior drawer test. Negative Joanne. Negative Apley grind. No medial or lateral joint line tenderness. No tenderness over the medial and lateral facet of the patella. patellofemoral crepitations. No lateral tilting patella. No patellar apprehension. No crepitation in the medial and lateral femoral condyle. No proximal or distal swelling, edema or tenderness. No gross motor or sensory deficits. No intra-articular swelling. 2+ DP and PT pulses. No varus or valgus malalignment. Skin is intact. No rashes, scars or lesions.  [de-identified] : The following radiographs were ordered and read by me during this patients visit. I reviewed each radiograph in detail with the patient and discussed the findings as highlighted below. \par \par 4 views of the Left knee show no fracture, dislocation or bony lesions. There is no evidence of degenerative change in the medial, lateral and patellofemoral compartments with maintenance of the joint space. Otherwise unremarkable.

## 2021-08-05 NOTE — ADDENDUM
[FreeTextEntry1] : Documented by Curry Jang acting as a scribe for Dr. Carmona on 08/05/2021. \par \par All medical record entries made by the Scribe were at my, Dr. Carmona's, direction and\par personally dictated by me on 08/05/2021. I have reviewed the chart and agree that the record\par accurately reflects my personal performance of the history, physical exam, procedure and imaging.

## 2021-08-05 NOTE — PROCEDURE
[de-identified] : Injection: Right knee joint.\par Indication: pain/effusion\par \par A discussion was had with the patient regarding this procedure and all questions were answered. All risks, benefits and alternatives were discussed. These included but were not limited to bleeding, infection, and allergic reaction. Alcohol was used to clean the skin, and betadine was used to sterilize and prep the area in the supero-lateral aspect of the right knee. Ethyl chloride spray was then used as a topical anesthetic. A 22-gauge needle was used to inject 3cc of 1% Xylocaine, 2cc of 0.25% Bupivacaine and 1cc of 40mg/mL Triamcinolone Acetonide into the right knee. A sterile bandage was then applied. The patient tolerated the procedure well and there were no complications

## 2021-11-30 ENCOUNTER — APPOINTMENT (OUTPATIENT)
Dept: ORTHOPEDIC SURGERY | Facility: CLINIC | Age: 51
End: 2021-11-30
Payer: MEDICAID

## 2021-11-30 VITALS
HEIGHT: 71 IN | BODY MASS INDEX: 27.3 KG/M2 | HEART RATE: 92 BPM | SYSTOLIC BLOOD PRESSURE: 159 MMHG | DIASTOLIC BLOOD PRESSURE: 99 MMHG | WEIGHT: 195 LBS

## 2021-11-30 DIAGNOSIS — M76.31 ILIOTIBIAL BAND SYNDROME, RIGHT LEG: ICD-10-CM

## 2021-11-30 PROCEDURE — 73030 X-RAY EXAM OF SHOULDER: CPT | Mod: RT

## 2021-11-30 PROCEDURE — 20610 DRAIN/INJ JOINT/BURSA W/O US: CPT | Mod: RT

## 2021-11-30 PROCEDURE — 99214 OFFICE O/P EST MOD 30 MIN: CPT | Mod: 25

## 2021-12-01 PROBLEM — M76.31 ILIOTIBIAL BAND TENDINITIS OF RIGHT SIDE: Status: ACTIVE | Noted: 2021-08-05

## 2021-12-01 NOTE — DISCUSSION/SUMMARY
[de-identified] : AMARIS is a 49 year old male who presents today for f/u evaluation right shoulder pain.   It is sharp, burning, shooting, and stabbing in nature.  Patient admits to seeing a chiropractor but denies any other treatments. He received cortisone injection about a yr ago, that was providing relief up until recently. He presents today requesting another cortisone injection. \par \par The patient presents today with acute onset of Right shoulder pain consistent with a clinical diagnosis of calcific tendinopathy. Radiographs show a calcification at the rotator cuff insertion. Clinically, the patient specific point tenderness to this location as well as positive impingement signs. I discussed the pathophysiology of the diagnosis and the distinction between the resorptive and formative phase.  I discussed the treatment of calcific tendinitis with the patient at length today and the inflammatory process it incites during resorptive phase.\par \par I discussed the treatment of calcific tendinitis with the patient including nonoperative management as first line treatment. Anti-inflammatory medications (NSAID's) with physical therapy for strengthening and conditioning of the joint to preserve shoulder range of motion is typically required. Corticosteroid injection and/or US guided percutaneous aspiration and lavage may be indicated for refractory cases and for acute symptomatic pain relief. If nonoperative management fails, arthroscopic debridement with possible rotator cuff repair if damaged during surgery may be required for recalcitrant cases.\par \par The patient elected to proceed with nonoperative management including a cortisone injection performed today that he tolerated well alongside starting HEP. A home exercise sheet was provided and discussed with patient to follow. We will see them again for clinical reassessment in 6-8 weeks. He agrees with the above plan and all questions were answered.

## 2021-12-01 NOTE — ADDENDUM
[FreeTextEntry1] : Documented by Curry Jang acting as a scribe for Dr. Carmona on 11/30/2021. \par \par All medical record entries made by the Scribe were at my, Dr. Carmona's, direction and\par personally dictated by me on 11/30/2021. I have reviewed the chart and agree that the record\par accurately reflects my personal performance of the history, physical exam, procedure and imaging.

## 2021-12-01 NOTE — HISTORY OF PRESENT ILLNESS
[Stable] : stable [___ yrs] : [unfilled] year(s) ago [3] : a current pain level of 3/10 [4] : an average pain level of 4/10 [1] : a minimum pain level of 1/10 [5] : a maximum pain level of 5/10 [Lifting] : worsened by lifting [Heat] : relieved by heat [Ice] : relieved by ice [NSAIDs] : relieved by nonsteroidal anti-inflammatory drugs [Rest] : relieved by rest [de-identified] : AMARIS is a 49 year old male who presents today for f/u evaluation right shoulder pain.   It is sharp, burning, shooting, and stabbing in nature.  Patient admits to seeing a chiropractor but denies any other treatments. He received cortisone injection about a yr ago, that was providing relief up until recently. He presents today requesting another cortisone injection.

## 2021-12-01 NOTE — PROCEDURE
[de-identified] : Injection: Right shoulder (Subacromial). \par Indication: Calcific tendinitis \par \par A discussion was had with the patient regarding this procedure and all questions were answered. All risks, benefits and alternatives were discussed. These included but were not limited to bleeding, infection, and allergic reaction. Alcohol was used to clean the skin, and betadine was used to sterilize and prep the area in the posterior aspect of the right shoulder. Ethyl chloride spray was then used as a topical anesthetic. A 22-gauge needle was used to inject 3cc 1% xylocaine, 2cc 0.25% bupivacaine and 1cc of 40mg/mL triamcinolone acetonide into the right subacromial space. A sterile bandage was then applied. The patient tolerated the procedure well and there were no complications.

## 2021-12-01 NOTE — PHYSICAL EXAM
[de-identified] : Physical Exam:\par General: Well appearing, no acute distress\par Neurologic: A&Ox3, No focal deficits\par Head: NCAT without abrasions, lacerations, or ecchymosis to head, face, or scalp\par Eyes: No scleral icterus, no gross abnormalities\par Respiratory: Equal chest wall expansion bilaterally, no accessory muscle use\par Lymphatic: No lymphadenopathy palpated\par Skin: Warm and dry\par Psychiatric: Normal mood and affect\par \par Upper trapezius and cervical paraspinal muscle spasm\par \par Right Shoulder\par ·	Inspection/Palpation: Tenderness to palpation over supraspinatus insertion, no crepitus, no deformities\par ·	Range of Motion: no crepitus with ROM; Active ; ER at side 35; IR to L1; Passive  with pain, ER at side 45, IR to T7\par ·	Strength: forward elevation in scapular plane [4/5], internal rotation [4/5], external rotation [4/5], adduction [4/5] and abduction [4/5]\par ·	Stability: no joint instability on provocative testing\par ·	Tests: Lao test positive, Neer positive, positive drop arm test secondary to pain, bear hug test positive, Napolean sign POS, cross arm adduction positive, lift off sign positive, hornblowers sign POS, speeds test negative, Yergason's test negative, Davidson's Active Compression test negative, whipple test positive, bicep's load II test negative\par \par Left Shoulder\par ·	Inspection/Palpation: no tenderness, swelling or deformities\par ·	Range of Motion: full and painless in all planes, no crepitus\par ·	Strength: forward elevation in scapular plane 5/5, internal rotation 5/5, external rotation 5/5, adduction 5/5 and abduction 5/5\par ·	Stability: no joint instability on provocative testing\par Tests: Lao test negative, Neer sign negative, negative drop arm test secondary to pain, bear hug test negative, Napolean sign negative, cross arm adduction negative, lift off sign positive, hornblowers sign negative, speeds test negative, Yergason's test negative, no bicipital groove tenderness, Davidson's Active Compression test negative\par \par Elbow Exam\par \par Skin: Clean, dry, intact. No ecchymosis. No swelling. No palpable joint effusion.\par ROM: RIGHT 0-130, full supination/pronation.  LEFT 0-140, full supination/pronation.\par Painful ROM: Pronation to lateral right elbow\par Tenderness: [No] medial epicondyle pain. Lateral epicondyle pain. [No] olecranon pain. No pain at radial head.\par Strength: 5/5 elbow flexion, 5/5 elbow extension, 5/5 supination, 5/5 pronation\par Stability: Stable to vaus/valgus stress\par Vasc: 2+ radial pulse, <2s cap refill\par Sensation: In tact to light touch throughout\par Neuro: Negative tinels at ulnar canal, AIN/PIN/Ulnar nerve in tact to motor/sensation.\par \par Special Tests:\par Dorsiflexion Against Resistance: Negative\par Flexion of Wrist Against Resistance: POS\par Resistance Against Pronation: POS\par Resistance Against Supination: Negative\par Tinel's Sign Cubital Tunnel: Negative [de-identified] : The following radiographs were ordered and read by me during this patients visit. I reviewed each radiograph in detail with the patient and discussed the findings as highlighted below. \par \par 4 views of the right shoulder shows calcium deposit at supraspinatus insertion, no fracture, dislocation or bony lesions. There is evidence of degenerative change in the glenohumeral and acromioclavicular joints. Otherwise unremarkable.\par

## 2022-04-05 ENCOUNTER — APPOINTMENT (OUTPATIENT)
Age: 52
End: 2022-04-05
Payer: MEDICAID

## 2022-04-05 PROCEDURE — 99214 OFFICE O/P EST MOD 30 MIN: CPT

## 2022-04-07 NOTE — HISTORY OF PRESENT ILLNESS
[Stable] : stable [___ yrs] : [unfilled] year(s) ago [3] : a current pain level of 3/10 [4] : an average pain level of 4/10 [1] : a minimum pain level of 1/10 [5] : a maximum pain level of 5/10 [Lifting] : worsened by lifting [Heat] : relieved by heat [Ice] : relieved by ice [NSAIDs] : relieved by nonsteroidal anti-inflammatory drugs [Rest] : relieved by rest [de-identified] : AMARIS is a 49 year old male who presents today for f/u evaluation right shoulder calcific tendonitis. Pt reports two injections in the past which offered improvement. Last one in November offered less improvement. Works as a . Denies parasthesias.

## 2022-04-07 NOTE — DISCUSSION/SUMMARY
[de-identified] : I had a lengthy discussion with the patient regarding their current condition. We discussed the treatment options including operative and nonoperative management. At this time I recommended an MRI of his right shoulder. We discussed barbatoge vs arthroscopy with debridement and possible RTC repair. We will make further recs following the completion of his MRI. \par

## 2022-04-07 NOTE — PHYSICAL EXAM
[de-identified] : Physical Exam:\par General: Well appearing, no acute distress\par Neurologic: A&Ox3, No focal deficits\par Head: NCAT without abrasions, lacerations, or ecchymosis to head, face, or scalp\par Eyes: No scleral icterus, no gross abnormalities\par Respiratory: Equal chest wall expansion bilaterally, no accessory muscle use\par Lymphatic: No lymphadenopathy palpated\par Skin: Warm and dry\par Psychiatric: Normal mood and affect\par \par Upper trapezius and cervical paraspinal muscle spasm\par \par Right Shoulder\par ·	Inspection/Palpation: Tenderness to palpation over supraspinatus insertion, no crepitus, no deformities\par ·	Range of Motion: no crepitus with ROM; Active ; ER at side 35; IR to L1; Passive  with pain, ER at side 45, IR to T7\par ·	Strength: forward elevation in scapular plane [4/5], internal rotation [4/5], external rotation [4/5], adduction [4/5] and abduction [4/5]\par ·	Stability: no joint instability on provocative testing\par ·	Tests: Lao test positive, Neer positive, positive drop arm test secondary to pain, bear hug test positive, Napolean sign POS, cross arm adduction positive, lift off sign positive, hornblowers sign POS, speeds test negative, Yergason's test negative, Davidson's Active Compression test negative, whipple test positive, bicep's load II test negative\par \par Left Shoulder\par ·	Inspection/Palpation: no tenderness, swelling or deformities\par ·	Range of Motion: full and painless in all planes, no crepitus\par ·	Strength: forward elevation in scapular plane 5/5, internal rotation 5/5, external rotation 5/5, adduction 5/5 and abduction 5/5\par ·	Stability: no joint instability on provocative testing\par Tests: Lao test negative, Neer sign negative, negative drop arm test secondary to pain, bear hug test negative, Napolean sign negative, cross arm adduction negative, lift off sign positive, hornblowers sign negative, speeds test negative, Yergason's test negative, no bicipital groove tenderness, Davidson's Active Compression test negative\par \par Elbow Exam\par \par Skin: Clean, dry, intact. No ecchymosis. No swelling. No palpable joint effusion.\par ROM: RIGHT 0-130, full supination/pronation.  LEFT 0-140, full supination/pronation.\par Painful ROM: Pronation to lateral right elbow\par Tenderness: [No] medial epicondyle pain. Lateral epicondyle pain. [No] olecranon pain. No pain at radial head.\par Strength: 5/5 elbow flexion, 5/5 elbow extension, 5/5 supination, 5/5 pronation\par Stability: Stable to vaus/valgus stress\par Vasc: 2+ radial pulse, <2s cap refill\par Sensation: In tact to light touch throughout\par Neuro: Negative tinels at ulnar canal, AIN/PIN/Ulnar nerve in tact to motor/sensation.\par \par Special Tests:\par Dorsiflexion Against Resistance: Negative\par Flexion of Wrist Against Resistance: POS\par Resistance Against Pronation: POS\par Resistance Against Supination: Negative\par Tinel's Sign Cubital Tunnel: Negative

## 2022-04-11 ENCOUNTER — OUTPATIENT (OUTPATIENT)
Dept: OUTPATIENT SERVICES | Facility: HOSPITAL | Age: 52
LOS: 1 days | End: 2022-04-11

## 2022-04-11 ENCOUNTER — APPOINTMENT (OUTPATIENT)
Dept: MRI IMAGING | Facility: CLINIC | Age: 52
End: 2022-04-11
Payer: MEDICAID

## 2022-04-11 DIAGNOSIS — M75.31 CALCIFIC TENDINITIS OF RIGHT SHOULDER: ICD-10-CM

## 2022-04-11 PROCEDURE — 73221 MRI JOINT UPR EXTREM W/O DYE: CPT | Mod: 26,RT

## 2022-04-14 ENCOUNTER — APPOINTMENT (OUTPATIENT)
Dept: ORTHOPEDIC SURGERY | Facility: CLINIC | Age: 52
End: 2022-04-14
Payer: MEDICAID

## 2022-04-14 PROCEDURE — 99442: CPT

## 2022-04-25 ENCOUNTER — APPOINTMENT (OUTPATIENT)
Dept: ORTHOPEDIC SURGERY | Facility: CLINIC | Age: 52
End: 2022-04-25
Payer: MEDICAID

## 2022-04-25 PROCEDURE — 99214 OFFICE O/P EST MOD 30 MIN: CPT | Mod: 25

## 2022-04-25 PROCEDURE — 20611 DRAIN/INJ JOINT/BURSA W/US: CPT | Mod: RT

## 2022-04-25 NOTE — PHYSICAL EXAM
[de-identified] : Physical Exam: \par General: Well appearing, no acute distress \par Neurologic: A&Ox3, No focal deficits \par Head: NCAT without abrasions, lacerations, or ecchymosis to head, face, or scalp \par Eyes: No scleral icterus, no gross abnormalities \par Respiratory: Equal chest wall expansion bilaterally, no accessory muscle use \par Lymphatic: No lymphadenopathy palpated \par Skin: Warm and dry \par Psychiatric: Normal mood and affect\par \par Examination of the Right shoulder shows no obvious deformity, swelling or erythema. Mild tenderness to palpation over the anterior shoulder. No AC joint tenderness. The patient demonstrates active/passive ROM of Forward Flexion to 165 degrees, External Rotation to 40 degrees and Internal Rotation to a mid lumbar level. The patient has a positive Lao and Neers test. No pain with cross body adduction, lift off testing, AC compression testing or Yergason testing. The patient has 4/5 strength to forward flexion with pronation, internal and external rotation. Compartments are soft and nontender. The patient has 2+ cap refill and sensation is intact in the hand. \par \par Left shoulder shows no deformity. No tenderness to palpation over the biceps or AC joint. The patient has Forward Flexion to 170 degrees, External Rotation to 45 degrees and Internal Rotation to a mid lumbar level. 5/5 strength to forward flexion with pronation, internal and external rotation. Compartments are soft and nontender. 2+ cap refill. Sensation intact distally.\par  [de-identified] :  EXAM: [id number] - MR SHOULDER RT  - ORDERED BY: CHACHA NEAL\par PROCEDURE DATE:  04/11/2022\par  \par Calcific tendinosis involving the anterior insertional fibers of supraspinatus tendon with trace peritendinous edema and overlying bursitis. Adjacent mild to moderate bursal surface fraying along the anterior/mid fibers of supraspinatus tendon without full-thickness or retracted tear.\par \par Findings suggestive of extra articular biceps tenosynovitis.\par \par Mild glenohumeral arthrosis.\par \par Moderate acromioclavicular joint arthrosis.\par

## 2022-04-25 NOTE — DISCUSSION/SUMMARY
[de-identified] : I had a lengthy discussion with the patient regarding their current condition. We discussed the treatment options including operative and nonoperative management. At this time I recommended a barbotage procedure. He would like to proceed after the risks and benefits were discussed with him. He has Thera-Band and will perform a HEP. He will f/u as needed. \par

## 2022-04-25 NOTE — HISTORY OF PRESENT ILLNESS
[de-identified] : AMARIS FERNÁNDEZ is a 51 year male being seen for f/u visit R shoulder pain. He has had persistent pain. He has had two cortisone injections in the past, the last one at least 6 months ago. He works as a . He reviewed his MRI by phone with Dr. Carmona. Patient presents for evaluation for possible US-guided perc lavage of R shoulder calcium deposit.

## 2022-04-25 NOTE — PROCEDURE
[de-identified] : Injection: US guided Right shoulder barbotage procedure and subacromial cortisone injection.    \par \par A discussion was had with the patient regarding this procedure and all questions were answered. All risks, benefits and alternatives were discussed. These included but were not limited to bleeding, infection, and allergic reaction. Ultrasound was utilized to confirm the anatomic location of the calcification. Alcohol was used to clean the skin, and ChloraPrep was used to sterilize and prep the area in the lateral aspect of the right shoulder. Ethyl chloride spray was then used as a topical anesthetic. A 22-gauge needle was used to inject 3cc 1% lidocaine into the subcutaneous tissue. After adequate analgesia was obtained the calcification was again localized. The injection site was again sterilized as above in a similar fashion. Using an 18 gauge needle the calcification was lavaged with 10ml of 1% lidocaine and aspirated. This yielded a small amount of calcific deposit. Switching syringes the subacromial space was then injected with a mixture of 2cc of 1% lidocaine, 2cc 0.25% bupivacaine and 1cc of 40mg/mL triamcinolone acetonide. A sterile band aid was then applied. The patient tolerated the procedure well and there were no complications. Post injection instructions were given.\par

## 2022-08-25 ENCOUNTER — APPOINTMENT (OUTPATIENT)
Dept: ORTHOPEDIC SURGERY | Facility: CLINIC | Age: 52
End: 2022-08-25

## 2022-08-25 VITALS
BODY MASS INDEX: 26.88 KG/M2 | DIASTOLIC BLOOD PRESSURE: 82 MMHG | HEIGHT: 71 IN | WEIGHT: 192 LBS | SYSTOLIC BLOOD PRESSURE: 144 MMHG | OXYGEN SATURATION: 99 % | HEART RATE: 82 BPM

## 2022-08-25 DIAGNOSIS — S16.1XXA STRAIN OF MUSCLE, FASCIA AND TENDON AT NECK LEVEL, INITIAL ENCOUNTER: ICD-10-CM

## 2022-08-25 DIAGNOSIS — Z78.9 OTHER SPECIFIED HEALTH STATUS: ICD-10-CM

## 2022-08-25 DIAGNOSIS — M62.830 MUSCLE SPASM OF BACK: ICD-10-CM

## 2022-08-25 PROCEDURE — 73030 X-RAY EXAM OF SHOULDER: CPT | Mod: RT

## 2022-08-25 PROCEDURE — 99214 OFFICE O/P EST MOD 30 MIN: CPT

## 2022-08-27 PROBLEM — S16.1XXA STRAIN OF CERVICAL PORTION OF RIGHT TRAPEZIUS MUSCLE: Status: ACTIVE | Noted: 2020-05-04

## 2022-08-27 PROBLEM — M62.830 PARASPINAL MUSCLE SPASM: Status: ACTIVE | Noted: 2020-05-04

## 2022-10-06 PROBLEM — Z78.9 NON-SMOKER: Status: ACTIVE | Noted: 2019-10-21

## 2022-10-09 NOTE — DISCUSSION/SUMMARY
[de-identified] : AMARIS is a 51 year male here today for follow up on right shoulder pain. He received 3 cortisone injections in the past, but the pain has returned. He reports worsening shoulder pain. Patient presents to discuss surgical intervention. \par \par We had a thorough discussion regarding the nature of his pain, the pathophysiology, as well as all treatment options. Patient has tried and failed all conservative treatment options including the activity modification, HEP, PT, cortisone injections and NSAIDs. Patient is considering right shoulder arthroscopy with possible RTC repair, SAD, acromioplasty and extensive debridement. The patient understands the most common risks include infection, allergy to the anesthetic and stiffness of the shoulder. The risks are accepted. The patient was given no assurances that all the symptoms will be alleviated. A packet with surgical information was provided. I had my surgical coordinator Tiago meet with her to discuss dates. A prescription of Meloxicam was given to be taken as directed with food to prevent GI upset, if occurs pt to D/C and call us at that time. All questions were answered and the patient verbalized understanding. The patient is in agreement with this treatment plan.

## 2022-10-09 NOTE — PHYSICAL EXAM
[de-identified] : Physical Exam: \par General: Well appearing, no acute distress \par Neurologic: A&Ox3, No focal deficits \par Head: NCAT without abrasions, lacerations, or ecchymosis to head, face, or scalp \par Eyes: No scleral icterus, no gross abnormalities \par Respiratory: Equal chest wall expansion bilaterally, no accessory muscle use \par Lymphatic: No lymphadenopathy palpated \par Skin: Warm and dry \par Psychiatric: Normal mood and affect\par \par Examination of the Right shoulder shows no obvious deformity, swelling or erythema. Mild tenderness to palpation over the anterior shoulder. No AC joint tenderness. The patient demonstrates active/passive ROM of Forward Flexion to 165 degrees, External Rotation to 40 degrees and Internal Rotation to a mid lumbar level. The patient has a positive Lao and Neers test. No pain with cross body adduction, lift off testing, AC compression testing or Yergason testing. The patient has 4/5 strength to forward flexion with pronation, internal and external rotation. Compartments are soft and nontender. The patient has 2+ cap refill and sensation is intact in the hand. \par \par Left shoulder shows no deformity. No tenderness to palpation over the biceps or AC joint. The patient has Forward Flexion to 170 degrees, External Rotation to 45 degrees and Internal Rotation to a mid lumbar level. 5/5 strength to forward flexion with pronation, internal and external rotation. Compartments are soft and nontender. 2+ cap refill. Sensation intact distally.\par  [de-identified] :  EXAM: [id number] - MR SHOULDER RT  - ORDERED BY: CHACHA NEAL\par PROCEDURE DATE:  04/11/2022\par  \par Calcific tendinosis involving the anterior insertional fibers of supraspinatus tendon with trace peritendinous edema and overlying bursitis. Adjacent mild to moderate bursal surface fraying along the anterior/mid fibers of supraspinatus tendon without full-thickness or retracted tear.\par \par Findings suggestive of extra articular biceps tenosynovitis.\par \par Mild glenohumeral arthrosis.\par \par Moderate acromioclavicular joint arthrosis.\par \par 4 views of the right shoulder were performed today available to me to review.  They demonstrate callus deposit over the supraspinatus insertion.  No other fracture no degenerative changes no deformities.\par

## 2022-10-09 NOTE — ADDENDUM
[FreeTextEntry1] : Documented by Daphney White acting as a scribe for Dr. Carmona and Roberto Cruz PA-C on 08/25/2022. \par \par All medical record entries made by the Scribe were at my, Dr. Carmona, and Roberto Cruz's, direction and\par personally dictated by me on 08/25/2022. I have reviewed the chart and agree that the record\par accurately reflects my personal performance of the history, physical exam, procedure and imaging.

## 2022-10-09 NOTE — HISTORY OF PRESENT ILLNESS
[de-identified] : AMARIS is a 51 year male here today for follow up on right shoulder pain. He received 3 cortisone injections in the past, but the pain has returned. He reports worsening shoulder pain. Patient presents to discuss surgical intervention. He is a non-smoker.

## 2022-10-12 ENCOUNTER — OUTPATIENT (OUTPATIENT)
Dept: OUTPATIENT SERVICES | Facility: HOSPITAL | Age: 52
LOS: 1 days | End: 2022-10-12
Payer: MEDICAID

## 2022-10-12 VITALS
SYSTOLIC BLOOD PRESSURE: 140 MMHG | HEIGHT: 71 IN | RESPIRATION RATE: 16 BRPM | HEART RATE: 79 BPM | OXYGEN SATURATION: 98 % | TEMPERATURE: 98 F | DIASTOLIC BLOOD PRESSURE: 72 MMHG | WEIGHT: 201.94 LBS

## 2022-10-12 DIAGNOSIS — Z98.890 OTHER SPECIFIED POSTPROCEDURAL STATES: Chronic | ICD-10-CM

## 2022-10-12 DIAGNOSIS — M25.511 PAIN IN RIGHT SHOULDER: ICD-10-CM

## 2022-10-12 DIAGNOSIS — Z01.818 ENCOUNTER FOR OTHER PREPROCEDURAL EXAMINATION: ICD-10-CM

## 2022-10-12 DIAGNOSIS — M19.011 PRIMARY OSTEOARTHRITIS, RIGHT SHOULDER: ICD-10-CM

## 2022-10-12 LAB
ANION GAP SERPL CALC-SCNC: 5 MMOL/L — SIGNIFICANT CHANGE UP (ref 5–17)
APTT BLD: 27.8 SEC — SIGNIFICANT CHANGE UP (ref 27.5–35.5)
BASOPHILS # BLD AUTO: 0.05 K/UL — SIGNIFICANT CHANGE UP (ref 0–0.2)
BASOPHILS NFR BLD AUTO: 0.8 % — SIGNIFICANT CHANGE UP (ref 0–2)
BUN SERPL-MCNC: 11 MG/DL — SIGNIFICANT CHANGE UP (ref 7–23)
CALCIUM SERPL-MCNC: 9 MG/DL — SIGNIFICANT CHANGE UP (ref 8.5–10.1)
CHLORIDE SERPL-SCNC: 101 MMOL/L — SIGNIFICANT CHANGE UP (ref 96–108)
CO2 SERPL-SCNC: 31 MMOL/L — SIGNIFICANT CHANGE UP (ref 22–31)
CREAT SERPL-MCNC: 0.73 MG/DL — SIGNIFICANT CHANGE UP (ref 0.5–1.3)
EGFR: 109 ML/MIN/1.73M2 — SIGNIFICANT CHANGE UP
EOSINOPHIL # BLD AUTO: 0.13 K/UL — SIGNIFICANT CHANGE UP (ref 0–0.5)
EOSINOPHIL NFR BLD AUTO: 2 % — SIGNIFICANT CHANGE UP (ref 0–6)
GLUCOSE SERPL-MCNC: 106 MG/DL — HIGH (ref 70–99)
HCT VFR BLD CALC: 40.7 % — SIGNIFICANT CHANGE UP (ref 39–50)
HGB BLD-MCNC: 13.7 G/DL — SIGNIFICANT CHANGE UP (ref 13–17)
IMM GRANULOCYTES NFR BLD AUTO: 0.3 % — SIGNIFICANT CHANGE UP (ref 0–0.9)
INR BLD: 0.91 RATIO — SIGNIFICANT CHANGE UP (ref 0.88–1.16)
LYMPHOCYTES # BLD AUTO: 1.14 K/UL — SIGNIFICANT CHANGE UP (ref 1–3.3)
LYMPHOCYTES # BLD AUTO: 17.4 % — SIGNIFICANT CHANGE UP (ref 13–44)
MCHC RBC-ENTMCNC: 32.5 PG — SIGNIFICANT CHANGE UP (ref 27–34)
MCHC RBC-ENTMCNC: 33.7 GM/DL — SIGNIFICANT CHANGE UP (ref 32–36)
MCV RBC AUTO: 96.7 FL — SIGNIFICANT CHANGE UP (ref 80–100)
MONOCYTES # BLD AUTO: 1.25 K/UL — HIGH (ref 0–0.9)
MONOCYTES NFR BLD AUTO: 19.1 % — HIGH (ref 2–14)
NEUTROPHILS # BLD AUTO: 3.96 K/UL — SIGNIFICANT CHANGE UP (ref 1.8–7.4)
NEUTROPHILS NFR BLD AUTO: 60.4 % — SIGNIFICANT CHANGE UP (ref 43–77)
PLATELET # BLD AUTO: 254 K/UL — SIGNIFICANT CHANGE UP (ref 150–400)
POTASSIUM SERPL-MCNC: 3.7 MMOL/L — SIGNIFICANT CHANGE UP (ref 3.5–5.3)
POTASSIUM SERPL-SCNC: 3.7 MMOL/L — SIGNIFICANT CHANGE UP (ref 3.5–5.3)
PROTHROM AB SERPL-ACNC: 10.6 SEC — SIGNIFICANT CHANGE UP (ref 10.5–13.4)
RBC # BLD: 4.21 M/UL — SIGNIFICANT CHANGE UP (ref 4.2–5.8)
RBC # FLD: 12.4 % — SIGNIFICANT CHANGE UP (ref 10.3–14.5)
SARS-COV-2 RNA SPEC QL NAA+PROBE: SIGNIFICANT CHANGE UP
SODIUM SERPL-SCNC: 137 MMOL/L — SIGNIFICANT CHANGE UP (ref 135–145)
WBC # BLD: 6.55 K/UL — SIGNIFICANT CHANGE UP (ref 3.8–10.5)
WBC # FLD AUTO: 6.55 K/UL — SIGNIFICANT CHANGE UP (ref 3.8–10.5)

## 2022-10-12 PROCEDURE — 85610 PROTHROMBIN TIME: CPT

## 2022-10-12 PROCEDURE — U0003: CPT

## 2022-10-12 PROCEDURE — 80048 BASIC METABOLIC PNL TOTAL CA: CPT

## 2022-10-12 PROCEDURE — U0005: CPT

## 2022-10-12 PROCEDURE — 93010 ELECTROCARDIOGRAM REPORT: CPT

## 2022-10-12 PROCEDURE — 85025 COMPLETE CBC W/AUTO DIFF WBC: CPT

## 2022-10-12 PROCEDURE — 99214 OFFICE O/P EST MOD 30 MIN: CPT | Mod: 25

## 2022-10-12 PROCEDURE — 85730 THROMBOPLASTIN TIME PARTIAL: CPT

## 2022-10-12 PROCEDURE — 93005 ELECTROCARDIOGRAM TRACING: CPT

## 2022-10-12 PROCEDURE — 36415 COLL VENOUS BLD VENIPUNCTURE: CPT

## 2022-10-12 NOTE — H&P PST ADULT - PROBLEM SELECTOR PLAN 1
Pre op and chlorhexidine instructions given and explained.  Avoid NSAIDs and OTC supplements.   Patient verbalized understanding  medical consult requested by surgeon  covid 19 swab scheduled, advised to quarantine after test Pre op and chlorhexidine instructions given and explained.  Avoid NSAIDs and OTC supplements.   Patient verbalized understanding  medical consult requested by surgeon  covid 19 swab done in PST today , advised to quarantine after test

## 2022-10-12 NOTE — H&P PST ADULT - NSICDXPASTMEDICALHX_GEN_ALL_CORE_FT
PAST MEDICAL HISTORY:  Right shoulder pain      PAST MEDICAL HISTORY:  GERD (gastroesophageal reflux disease)     Right shoulder pain     Seasonal allergies

## 2022-10-12 NOTE — H&P PST ADULT - HISTORY OF PRESENT ILLNESS
51 y/o male presents to PST for scheduled right shoulder arthroscopy on 10/14/22. Patient with c/o right shoulder pain, seen by orthopedic steriod injections given for pain and over the years less effective with minimal to no pain relief.

## 2022-10-13 DIAGNOSIS — Z01.818 ENCOUNTER FOR OTHER PREPROCEDURAL EXAMINATION: ICD-10-CM

## 2022-10-13 DIAGNOSIS — M19.011 PRIMARY OSTEOARTHRITIS, RIGHT SHOULDER: ICD-10-CM

## 2022-10-13 PROBLEM — M25.511 PAIN IN RIGHT SHOULDER: Chronic | Status: ACTIVE | Noted: 2022-10-12

## 2022-10-14 ENCOUNTER — APPOINTMENT (OUTPATIENT)
Dept: ORTHOPEDIC SURGERY | Facility: HOSPITAL | Age: 52
End: 2022-10-14

## 2022-10-14 ENCOUNTER — TRANSCRIPTION ENCOUNTER (OUTPATIENT)
Age: 52
End: 2022-10-14

## 2022-10-14 ENCOUNTER — RESULT REVIEW (OUTPATIENT)
Age: 52
End: 2022-10-14

## 2022-10-14 ENCOUNTER — OUTPATIENT (OUTPATIENT)
Dept: INPATIENT UNIT | Facility: HOSPITAL | Age: 52
LOS: 1 days | Discharge: HOME | End: 2022-10-14
Payer: MEDICAID

## 2022-10-14 VITALS
TEMPERATURE: 98 F | OXYGEN SATURATION: 100 % | RESPIRATION RATE: 16 BRPM | WEIGHT: 201.94 LBS | HEIGHT: 71 IN | DIASTOLIC BLOOD PRESSURE: 101 MMHG | SYSTOLIC BLOOD PRESSURE: 139 MMHG | HEART RATE: 83 BPM

## 2022-10-14 VITALS
OXYGEN SATURATION: 97 % | RESPIRATION RATE: 17 BRPM | SYSTOLIC BLOOD PRESSURE: 132 MMHG | DIASTOLIC BLOOD PRESSURE: 76 MMHG | HEART RATE: 98 BPM

## 2022-10-14 DIAGNOSIS — M19.011 PRIMARY OSTEOARTHRITIS, RIGHT SHOULDER: ICD-10-CM

## 2022-10-14 DIAGNOSIS — Z98.890 OTHER SPECIFIED POSTPROCEDURAL STATES: Chronic | ICD-10-CM

## 2022-10-14 DIAGNOSIS — M75.31 CALCIFIC TENDINITIS OF RIGHT SHOULDER: ICD-10-CM

## 2022-10-14 PROCEDURE — 29828 SHO ARTHRS SRG BICP TENODSIS: CPT | Mod: RT

## 2022-10-14 PROCEDURE — 88304 TISSUE EXAM BY PATHOLOGIST: CPT

## 2022-10-14 PROCEDURE — 29826 SHO ARTHRS SRG DECOMPRESSION: CPT | Mod: RT

## 2022-10-14 PROCEDURE — C9399: CPT

## 2022-10-14 PROCEDURE — C1713: CPT

## 2022-10-14 PROCEDURE — 88304 TISSUE EXAM BY PATHOLOGIST: CPT | Mod: 26

## 2022-10-14 PROCEDURE — 29827 SHO ARTHRS SRG RT8TR CUF RPR: CPT | Mod: RT

## 2022-10-14 PROCEDURE — 29823 SHO ARTHRS SRG XTNSV DBRDMT: CPT | Mod: RT,59

## 2022-10-14 RX ORDER — HYDRALAZINE HCL 50 MG
10 TABLET ORAL ONCE
Refills: 0 | Status: COMPLETED | OUTPATIENT
Start: 2022-10-14 | End: 2022-10-14

## 2022-10-14 RX ORDER — LEVOCETIRIZINE DIHYDROCHLORIDE 0.5 MG/ML
1 SOLUTION ORAL
Qty: 0 | Refills: 0 | DISCHARGE

## 2022-10-14 RX ORDER — OXYCODONE HYDROCHLORIDE 5 MG/1
1 TABLET ORAL
Qty: 24 | Refills: 0
Start: 2022-10-14 | End: 2022-10-17

## 2022-10-14 RX ORDER — ONDANSETRON 8 MG/1
4 TABLET, FILM COATED ORAL ONCE
Refills: 0 | Status: DISCONTINUED | OUTPATIENT
Start: 2022-10-14 | End: 2022-10-14

## 2022-10-14 RX ORDER — OMEPRAZOLE 10 MG/1
1 CAPSULE, DELAYED RELEASE ORAL
Qty: 0 | Refills: 0 | DISCHARGE

## 2022-10-14 RX ORDER — MONTELUKAST 4 MG/1
1 TABLET, CHEWABLE ORAL
Qty: 0 | Refills: 0 | DISCHARGE

## 2022-10-14 RX ORDER — UBIDECARENONE 100 MG
1 CAPSULE ORAL
Qty: 0 | Refills: 0 | DISCHARGE

## 2022-10-14 RX ORDER — FENTANYL CITRATE 50 UG/ML
50 INJECTION INTRAVENOUS
Refills: 0 | Status: DISCONTINUED | OUTPATIENT
Start: 2022-10-14 | End: 2022-10-14

## 2022-10-14 RX ORDER — DOCUSATE SODIUM 100 MG
1 CAPSULE ORAL
Qty: 60 | Refills: 0
Start: 2022-10-14 | End: 2022-11-12

## 2022-10-14 RX ORDER — ONDANSETRON 8 MG/1
1 TABLET, FILM COATED ORAL
Qty: 15 | Refills: 0
Start: 2022-10-14 | End: 2022-10-18

## 2022-10-14 RX ORDER — SODIUM CHLORIDE 9 MG/ML
1000 INJECTION, SOLUTION INTRAVENOUS
Refills: 0 | Status: DISCONTINUED | OUTPATIENT
Start: 2022-10-14 | End: 2022-10-14

## 2022-10-14 RX ORDER — OXYCODONE HYDROCHLORIDE 5 MG/1
5 TABLET ORAL ONCE
Refills: 0 | Status: DISCONTINUED | OUTPATIENT
Start: 2022-10-14 | End: 2022-10-14

## 2022-10-14 RX ORDER — ATORVASTATIN CALCIUM 80 MG/1
1 TABLET, FILM COATED ORAL
Qty: 0 | Refills: 0 | DISCHARGE

## 2022-10-14 RX ADMIN — Medication 10 MILLIGRAM(S): at 15:00

## 2022-10-14 NOTE — ASU DISCHARGE PLAN (ADULT/PEDIATRIC) - CARE PROVIDER_API CALL
Benjamín Carmona (DO)  [address], [address]  [place] [address]  Phone: [phone]  Fax: [phone]  Follow Up Time:

## 2022-10-14 NOTE — ASU PATIENT PROFILE, ADULT - NSICDXPASTMEDICALHX_GEN_ALL_CORE_FT
PAST MEDICAL HISTORY:  GERD (gastroesophageal reflux disease)     HLD (hyperlipidemia)     Right shoulder pain     Seasonal allergies

## 2022-10-14 NOTE — ASU DISCHARGE PLAN (ADULT/PEDIATRIC) - NS MD DC FALL RISK RISK
For information on Fall & Injury Prevention, visit: [email or web address] OR  [email or web address] OR  https://www.cdc.gov/steadi/patient.html

## 2022-10-14 NOTE — ASU DISCHARGE PLAN (ADULT/PEDIATRIC) - ASU DISCHARGE DATE/TIME
Discharge Summary/Instructions after an Endoscopic Procedure  Patient Name: Ashish Dave  Patient MRN: [id number]  Patient YOB: 1955 Monday, June 6, 2022  Pj Davidson MD  Dear patient,  As a result of recent federal legislation (The Federal Cures Act), you may   receive lab or pathology results from your procedure in your [facility]   account before your physician is able to contact you. Your physician or   their representative will relay the results to you with their   recommendations at their soonest availability.  Thank you,  RESTRICTIONS:  During your procedure today, you received medications for sedation.  These   medications may affect your judgment, balance and coordination.  Therefore,   for 24 hours, you have the following restrictions:   - DO NOT drive a car, operate machinery, make legal/financial decisions,   sign important papers or drink alcohol.    ACTIVITY:  Today: no heavy lifting, straining or running due to procedural   sedation/anesthesia.  The following day: return to full activity including work.  DIET:  Eat and drink normally unless instructed otherwise.     TREATMENT FOR COMMON SIDE EFFECTS:  - Mild abdominal pain, nausea, belching, bloating or excessive gas:  rest,   eat lightly and use a heating pad.  - Sore Throat: treat with throat lozenges and/or gargle with warm salt   water.  - Because air was used during the procedure, expelling large amounts of air   from your rectum or belching is normal.  - If a bowel prep was taken, you may not have a bowel movement for 1-3 days.    This is normal.  SYMPTOMS TO WATCH FOR AND REPORT TO YOUR PHYSICIAN:  1. Abdominal pain or bloating, other than gas cramps.  2. Chest pain.  3. Back pain.  4. Signs of infection such as: chills or fever occurring within 24 hours   after the procedure.  5. Rectal bleeding, which would show as bright red, maroon, or black stools.   (A tablespoon of blood from the rectum is not serious, especially if    hemorrhoids are present.)  6. Vomiting.  7. Weakness or dizziness.  GO DIRECTLY TO THE NEAREST EMERGENCY ROOM IF YOU HAVE ANY OF THE FOLLOWING:      Difficulty breathing              Chills and/or fever over 101 F   Persistent vomiting and/or vomiting blood   Severe abdominal pain   Severe chest pain   Black, tarry stools   Bleeding- more than one tablespoon   Any other symptom or condition that you feel may need urgent attention  Your doctor recommends these additional instructions:  If any biopsies were taken, your doctors clinic will contact you in 1 to 2   weeks with any results.  - Patient has a contact number available for emergencies.  The signs and   symptoms of potential delayed complications were discussed with the   patient.  Return to normal activities tomorrow.  Written discharge   instructions were provided to the patient.   - Resume previous diet.   - Continue present medications.   - No aspirin, ibuprofen, naproxen, or other non-steroidal anti-inflammatory   drugs.   - Await pathology results.   - Discharge patient to home (ambulatory).   - Follow an antireflux regimen.   - Return to GI clinic after studies are complete.  For questions, problems or results please call your physician - Pj Davidson MD at Work:  [phone].  [facility], EMERGENCY ROOM PHONE NUMBER: [phone]  IF A COMPLICATION OR EMERGENCY SITUATION ARISES AND YOU ARE UNABLE TO REACH   YOUR PHYSICIAN - GO DIRECTLY TO THE EMERGENCY ROOM.  Pj Davidson MD  6/6/2022 11:09:27 AM  This report has been verified and signed electronically.  Dear patient,  As a result of recent federal legislation (The Federal Cures Act), you may   receive lab or pathology results from your procedure in your [facility]   account before your physician is able to contact you. Your physician or   their representative will relay the results to you with their   recommendations at their soonest availability.  Thank you,  [employer]   14-Oct-2022

## 2022-10-14 NOTE — ASU DISCHARGE PLAN (ADULT/PEDIATRIC) - ASU DC SPECIAL INSTRUCTIONSFT
POST-OPERATIVE INSTRUCTION SHEET: ROTATOR CUFF REPAIR       MEDICATIONS: You will be given a prescription for pain medication prior to discharge. This medication may be taken as directed for breakthrough pain. You should try taking Extra Strength Tylenol first (500 mg every 4 hours) which is available over-the-counter. Be sure not to exceed 3,000 mg in 24 hours. We ask that you avoid NSAIDs (Advil, Motrin, Aleve, ibuprofen, etc.) for the first few weeks after your surgery as these may interrupt tendon healing.     You should take a stool softener while you are taking narcotics. The pain medication can cause significant constipation. Tereza-Colace can be purchased over the counter and is taken twice daily.     ICE: An ice device or an ice bag (not directly touching the skin) should be utilized to reduce swelling and pain. Please ice every 3-4 hours for about 15-20 minutes each time for at least the first 5 days or until swelling subsides. We have Polar Ice devices in our office available for purchase and our staff would be happy to assist you with this. Although this is helpful, it is not mandatory and available to you only if you would like one.      SLING: You will be given a sling with an abduction pillow. This should be worn for at least 4-6 weeks unless directed by Dr. Carmona or Becca. The sling may be removed for hygiene and for exercises. You should sleep with the sling on.     EXERCISES: Pendulum exercises to be performed for 3-5 minutes every 1-2 hours. When lying in bed, elevate the head of your bed. Move your fingers, hand and elbow to increase circulation. DO NOT lift your arm at the shoulder as to avoid destroying the repair, until it has healed (when Cleared by Dr. Carmona)    PHYSICAL THERAPY: You will begin PT usually 2 weeks after surgery and a PT prescription and protocol will be given to you at your first post-operative appointment. Please plan to begin PT within a week of this appointment. You will schedule this on your own but we can assist you in finding a convenient facility.      WOUND CARE:  Leave your surgical dressing on for 3 days. After 3 days you may remove your dressing and shower. Dry the incisions well and apply a small dressing or Band-Aid over the incisions. Keeping these areas dry and clean is very important.     FOLLOW UP: If you do not already have a follow-up visit scheduled, please call [phone] to schedule one with Dr. Carmona or Becca within 7-10 days for suture removal and to receive the PT prescription and protocol.

## 2022-10-14 NOTE — ASU PATIENT PROFILE, ADULT - WILL THE PATIENT ACCEPT THE PFIZER COVID-19 VACCINE IF ELIGIBLE AND IT IS AVAILABLE?
7/11/19, 2:05 PM      Venaxis day: 2  [facility]: [facility]A Reason for admit: Infected orthopedic [facility], [facility] Yusef. 7XXA]   Procedure:   07/09/19  By Dr Zhang Gram of Care:  POD #2- culture report request by Dr. Lizbeth Herrera from [address] [place] [address], follow PT and OT notes, pain control, dressing care, continue accu checks, gaffney catheter removed, noted bloody urine in am, on Barrett Koehler, I/O.      PCP: Kahlil Dia DO     Readmission Risk Score: 10%    Discharge Plan: met with Duane, he has right mediport and would like to complete OP IV antibiotics at [address] [place] [address] OP dept, awaiting on call back. 14:25 pm- Paula from [facility] called back, updated on need for Rocephin 2 GM IV daily as OP. Faxed all paperwork ( H&P, med sheet, op report, allergies, ID progress note from 7/11). Dr. Lizbeth Herrera updated this CM- after speaking with ID physician patient feels he may want to go to [facility] for IV antibiotic since he is weak post op. SW updated and consulted. 15:10 pm- spoke with Duane and his wife; he goes to [address] [place] [address] in [facility] 10 days for IV [facility] treatments for his Myasthenis Gravis. His next treatment is Thur 7/18 at 11 am - 12:00pm.  Wife transports him and the tx costs $65,500.00 each time. He feels weaker and having issues with getting in and out car - his first choice is [facility] and 2nd is [employer]. SW to follow patient on 07/12/19. No

## 2022-10-14 NOTE — ASU PATIENT PROFILE, ADULT - FALL HARM RISK - UNIVERSAL INTERVENTIONS
Bed in lowest position, wheels locked, appropriate side rails in place/Call bell, personal items and telephone in reach/Instruct patient to call for assistance before getting out of bed or chair/Non-slip footwear when patient is out of bed/[place] to call system/Physically safe environment - no spills, clutter or unnecessary equipment/Purposeful Proactive Rounding/Room/bathroom lighting operational, light cord in reach

## 2022-10-21 DIAGNOSIS — E78.5 HYPERLIPIDEMIA, UNSPECIFIED: ICD-10-CM

## 2022-10-21 DIAGNOSIS — S46.011A STRAIN OF MUSCLE(S) AND TENDON(S) OF THE ROTATOR CUFF OF RIGHT SHOULDER, INITIAL ENCOUNTER: ICD-10-CM
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
## 2022-10-21 DIAGNOSIS — M75.41 IMPINGEMENT SYNDROME OF RIGHT SHOULDER: ICD-10-CM
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
## 2022-10-21 DIAGNOSIS — K21.9 GASTRO-ESOPHAGEAL REFLUX DISEASE WITHOUT ESOPHAGITIS: ICD-10-CM

## 2022-10-21 DIAGNOSIS — M75.101 UNSPECIFIED ROTATOR CUFF TEAR OR RUPTURE OF RIGHT SHOULDER, NOT SPECIFIED AS TRAUMATIC: ICD-10-CM

## 2022-10-21 DIAGNOSIS — S43.431A SUPERIOR GLENOID LABRUM LESION OF RIGHT SHOULDER, INITIAL ENCOUNTER: ICD-10-CM

## 2022-10-26 ENCOUNTER — NON-APPOINTMENT (OUTPATIENT)
Age: 52
End: 2022-10-26

## 2022-10-27 ENCOUNTER — APPOINTMENT (OUTPATIENT)
Dept: ORTHOPEDIC SURGERY | Facility: CLINIC | Age: 52
End: 2022-10-27

## 2022-10-27 PROCEDURE — 99024 POSTOP FOLLOW-UP VISIT: CPT

## 2022-10-27 PROCEDURE — 73030 X-RAY EXAM OF SHOULDER: CPT | Mod: RT

## 2022-10-27 RX ORDER — OXYCODONE 5 MG/1
5 TABLET ORAL
Qty: 15 | Refills: 0 | Status: ACTIVE | COMMUNITY
Start: 2022-10-27 | End: 1900-01-01

## 2022-10-28 NOTE — HISTORY OF PRESENT ILLNESS
[___ Days Post Op] : post op day #[unfilled] [5] : the patient reports pain that is 5/10 in severity [Clean/Dry/Intact] : clean, dry and intact [Neuro Intact] : an unremarkable neurological exam [Vascular Intact] : ~T peripheral vascular exam normal [Negative Griselda's] : maneuvers demonstrated a negative Griselda's sign [Xray (Date:___)] : [unfilled] Xray -  [Doing Well] : is doing well [Chills] : no chills [Fever] : no fever [Nausea] : no nausea [Vomiting] : no vomiting [de-identified] : s/p right shoulder, RTC extensive debridement, SAD acromioplasty. DOS: 10/14/22.  [de-identified] : AMARIS is a 52 year male here today s/p right shoulder, RTC extensive debridement, SAD acromioplasty. DOS: 10/14/22. He states his initial pain has decreased. He denies fever or chills, redness around or near the incision site(s), numbness/tingling. He denies nausea/ vomiting and admits to their appetite since their surgery being back to normal. Normal bowel habits at this time. Patient has not  started physical therapy. Patient presents today in right shoulder sling and pillow. He inquired about main medication renewal due to current pain. He is going on vacation for 10 days and will start physical therapy when he returns.  [de-identified] : Right Shoulder \par \par Incisions are clean, dry and intact. No surrounding erythema. No drainage. Wound appears to be healing well. ROM deferred. Motor and sensation are intact distally. He has full range of motion of all fingers with capillary refill of <2 seconds throughout. He has good nerve function and median nerve, ulnar, radial, PIN, AIN. He has no sensory deficits over the C5-T1 nerve roots. Radial pulses 2+. Able to make an A-OK sign and thumbs up sign: able to flex/extend thumb, able to cross middle over index finger. \par \par Full ROM elbow, wrist, hand  [de-identified] : 2 view xrays of pt right shoulder were performed today and available for me to review. No fracture. No dislocation. No other deformities. Humeral head not high riding, adequate position of humeral head in glenoid.  [de-identified] : AMARIS is a 52 year male here today s/p right shoulder, RTC extensive debridement, SAD acromioplasty. DOS: 10/14/22. He can discontinue use of pillow at this time as tolerated. Patient was given prescription of formal physical therapy that he will perform 2x/wk for 6-8 wks. He can do home exercises and will start physical therapy after returning from vacation. His prescription of oxycodone was renewed today. Patient will follow up in 4 wks for repeat clinical assessment. All questions were answered and the patient verbalized understanding. The patient is in agreement with this treatment plan.

## 2022-10-28 NOTE — ADDENDUM
[FreeTextEntry1] : Documented by Daphney White acting as a scribe for Dr. Carmona and Roberto Cruz PA-C on 10/27/2022. \par \par All medical record entries made by the Scribe were at my, Dr. Carmona, and Roberto Cruz's, direction and\par personally dictated by me on 10/27/2022. I have reviewed the chart and agree that the record\par accurately reflects my personal performance of the history, physical exam, procedure and imaging.

## 2022-11-16 ENCOUNTER — APPOINTMENT (OUTPATIENT)
Dept: ORTHOPEDIC SURGERY | Facility: CLINIC | Age: 52
End: 2022-11-16

## 2022-11-16 PROCEDURE — 99024 POSTOP FOLLOW-UP VISIT: CPT

## 2022-11-16 RX ORDER — MELOXICAM 15 MG/1
15 TABLET ORAL
Qty: 21 | Refills: 1 | Status: ACTIVE | COMMUNITY
Start: 2022-11-16 | End: 1900-01-01

## 2022-11-16 NOTE — HISTORY OF PRESENT ILLNESS
[___ Days Post Op] : post op day #[unfilled] [5] : the patient reports pain that is 5/10 in severity [Clean/Dry/Intact] : clean, dry and intact [Neuro Intact] : an unremarkable neurological exam [Vascular Intact] : ~T peripheral vascular exam normal [Negative Griselda's] : maneuvers demonstrated a negative Griselda's sign [Xray (Date:___)] : [unfilled] Xray -  [Doing Well] : is doing well [Chills] : no chills [Fever] : no fever [Nausea] : no nausea [Vomiting] : no vomiting [de-identified] : s/p right shoulder, RTC extensive debridement, SAD acromioplasty. DOS: 10/14/22.  [de-identified] : AMARIS is a 52 year male here today s/p right shoulder, RTC extensive debridement, SAD acromioplasty. DOS: 10/14/22. He states his initial pain has decreased. He denies fever or chills, redness around or near the incision site(s), numbness/tingling. He denies nausea/ vomiting and admits to their appetite since their surgery being back to normal. Normal bowel habits at this time. Patient has started physical therapy. Patient presents today in right shoulder sling.  He has returned to work as a DJ and .  Overall he feels he is improving. [de-identified] : Right Shoulder \par \par Incisions are clean, dry and intact. No surrounding erythema. No drainage. Wound appears to be healing well.  She has active forward flexion to 105 degrees external rotation to 35 degrees.. Motor and sensation are intact distally. He has full range of motion of all fingers with capillary refill of <2 seconds throughout. He has good nerve function and median nerve, ulnar, radial, PIN, AIN. He has no sensory deficits over the C5-T1 nerve roots. Radial pulses 2+. Able to make an A-OK sign and thumbs up sign: able to flex/extend thumb, able to cross middle over index finger. \par \par Full ROM elbow, wrist, hand  [de-identified] : AMARIS is a 52 year male here today s/p right shoulder, RTC extensive debridement, SAD acromioplasty. DOS: 10/14/22. He can wean from his sling.  He can apply ice and moist heat.  He can continue with PT.  He will follow-up in about 6 weeks.  He will call us sooner if there are any issues.  I refilled his Mobic, GI precautions were reviewed.

## 2022-11-23 ENCOUNTER — RX RENEWAL (OUTPATIENT)
Age: 52
End: 2022-11-23

## 2023-01-11 ENCOUNTER — APPOINTMENT (OUTPATIENT)
Dept: ORTHOPEDIC SURGERY | Facility: CLINIC | Age: 53
End: 2023-01-11

## 2023-01-18 PROBLEM — E78.5 HYPERLIPIDEMIA, UNSPECIFIED: Chronic | Status: ACTIVE | Noted: 2022-10-14

## 2023-01-18 PROBLEM — K21.9 GASTRO-ESOPHAGEAL REFLUX DISEASE WITHOUT ESOPHAGITIS: Chronic | Status: ACTIVE | Noted: 2022-10-12

## 2023-01-18 PROBLEM — J30.2 OTHER SEASONAL ALLERGIC RHINITIS: Chronic | Status: ACTIVE | Noted: 2022-10-12

## 2023-01-25 ENCOUNTER — APPOINTMENT (OUTPATIENT)
Dept: ORTHOPEDIC SURGERY | Facility: CLINIC | Age: 53
End: 2023-01-25
Payer: MEDICAID

## 2023-01-25 PROCEDURE — 99213 OFFICE O/P EST LOW 20 MIN: CPT

## 2023-01-25 NOTE — HISTORY OF PRESENT ILLNESS
[Stable] : stable [___ wks] : [unfilled] week(s) ago [de-identified] : AMARIS is a 52 year male here today s/p right shoulder, RTC extensive debridement, SAD acromioplasty, 15 weeks ago. He has been attending physical therapy 2x/week with improvements. However, he reports a couple of days ago he went to quickly scratch an itch over his back and felt a pop in his R shoulder. He notes increases in pain since this event over the anterior aspect of the shoulder. He notes there was no swelling, palpable deformity or contusions after he felt the pop. Patient denies numbness and tingling to the extremities.

## 2023-01-25 NOTE — DISCUSSION/SUMMARY
[de-identified] : I had a lengthy discussion with the patient regarding their current condition. We discussed the treatment options including operative and nonoperative management. At this time I recommended an MRI of his right shoulder to rule out a ruptured proximal biceps and failed tenodesis.  We discussed operative and nonoperative management of this.  He is unsure how he would like to proceed.  We will obtain confirmation via MRI and he will follow-up with Dr. Carmona afterwards.  All questions were answered.

## 2023-01-25 NOTE — REVIEW OF SYSTEMS
[Negative] : Heme/Lymph [FreeTextEntry9] : s/p right shoulder, RTC extensive debridement, SAD acromioplasty. DOS: 10/14/22.

## 2023-01-25 NOTE — PHYSICAL EXAM
[de-identified] : Right Shoulder \par \par Incisions are clean, dry and intact. No surrounding erythema. No drainage. Wound appears to be healing well. He has active forward flexion to 155 degrees external rotation to 55 degrees.  There appears to be a subtle deformity of the proximal bicep which may represent a De sign.  There is tenderness over the anterior shoulder.  He has 4+ strength rotator cuff testing x3.  Motor and sensation are intact distally. He has full range of motion of all fingers with capillary refill of <2 seconds throughout. He has good nerve function and median nerve, ulnar, radial, PIN, AIN. He has no sensory deficits over the C5-T1 nerve roots. Radial pulses 2+. Able to make an A-OK sign and thumbs up sign: able to flex/extend thumb, able to cross middle over index finger. \par \par Full ROM elbow, wrist, hand. The surgical incision site(s) was clean, dry and intact. Additional findings included an unremarkable neurological exam, peripheral vascular exam normal and maneuvers demonstrated a negative Griselda's sign.  [de-identified] : X-rays including 2 views of the right shoulder taken today in the office are reviewed.  These reveal no fractures or acute bony injuries.

## 2023-02-10 ENCOUNTER — APPOINTMENT (OUTPATIENT)
Dept: MRI IMAGING | Facility: CLINIC | Age: 53
End: 2023-02-10
Payer: MEDICAID

## 2023-02-10 ENCOUNTER — OUTPATIENT (OUTPATIENT)
Dept: OUTPATIENT SERVICES | Facility: HOSPITAL | Age: 53
LOS: 1 days | End: 2023-02-10

## 2023-02-10 DIAGNOSIS — Z98.890 OTHER SPECIFIED POSTPROCEDURAL STATES: Chronic | ICD-10-CM

## 2023-02-10 DIAGNOSIS — S46.211A STRAIN OF MUSCLE, FASCIA AND TENDON OF OTHER PARTS OF BICEPS, RIGHT ARM, INITIAL ENCOUNTER: ICD-10-CM

## 2023-02-10 PROCEDURE — 73221 MRI JOINT UPR EXTREM W/O DYE: CPT | Mod: 26,RT

## 2023-02-15 ENCOUNTER — APPOINTMENT (OUTPATIENT)
Dept: ORTHOPEDIC SURGERY | Facility: CLINIC | Age: 53
End: 2023-02-15
Payer: MEDICAID

## 2023-02-15 DIAGNOSIS — M75.31 CALCIFIC TENDINITIS OF RIGHT SHOULDER: ICD-10-CM

## 2023-02-15 DIAGNOSIS — S46.211A STRAIN OF MUSCLE, FASCIA AND TENDON OF OTHER PARTS OF BICEPS, RIGHT ARM, INITIAL ENCOUNTER: ICD-10-CM

## 2023-02-15 PROCEDURE — 99214 OFFICE O/P EST MOD 30 MIN: CPT

## 2023-02-15 NOTE — REVIEW OF SYSTEMS
[Negative] : Heme/Lymph [FreeTextEntry9] : s/p right shoulder, RTC extensive debridement of calcific tendinitis with subsequent rotator cuff repair, arthroscopic biceps tenodesis, SAD acromioplasty. DOS: 10/14/22.

## 2023-02-15 NOTE — HISTORY OF PRESENT ILLNESS
[de-identified] : AMARIS FERNÁNDEZ is a 52 year male being seen for s/p right shoulder, RTC repair following extensive debridement of calcific tendinitis, SAD acromioplasty, arthroscopic biceps tenodesis 4 months ago.  Date of surgery 10/14/2022.  He reports some improvement since last visit, but reports continued pain when trying to sleep at night. He presents for MRI review. MRI reveals: \par \par IMPRESSION:\par 1.  Interval supraspinatus repair with new focal full-thickness mildly retracted tear measuring 6 mm AP.\par 2.  New focal full-thickness tear of the biceps tendon long head at the level of the proximal humeral metaphysis, with few diminutive residual fibers at the biceps/labral anchor.\par 3.  Moderate AC joint degenerative changes.\par 4.  Mild degenerative fraying of the superior labrum as before.\par 5.  Moderate cartilage thinning at the humeral head. Small effusion.

## 2023-02-15 NOTE — PHYSICAL EXAM
[de-identified] : Right Shoulder \par \par Incisions are clean, dry and intact. No surrounding erythema. No drainage. Wound appears to be healing well. He has active forward flexion to 155 degrees external rotation to 55 degrees. There appears to be a subtle deformity of the proximal bicep which may represent a De sign. There is tenderness over the anterior shoulder. He has 4+ strength rotator cuff testing x3. Motor and sensation are intact distally. He has full range of motion of all fingers with capillary refill of <2 seconds throughout. He has good nerve function and median nerve, ulnar, radial, PIN, AIN. He has no sensory deficits over the C5-T1 nerve roots. Radial pulses 2+. Able to make an A-OK sign and thumbs up sign: able to flex/extend thumb, able to cross middle over index finger. \par \par Full ROM elbow, wrist, hand. The surgical incision site(s) was clean, dry and intact. Additional findings included an unremarkable neurological exam, peripheral vascular exam normal and maneuvers demonstrated a negative Griselda's sign.  [de-identified] : EXAM: [id number] - MR SHOULDER RT  - ORDERED BY: PETAR MONTANA\par PROCEDURE DATE:  02/10/2023\par \par IMPRESSION:\par 1.  Interval supraspinatus repair with new focal full-thickness mildly retracted tear measuring 6 mm AP.\par 2.  New focal full-thickness tear of the biceps tendon long head at the level of the proximal humeral metaphysis, with few diminutive residual fibers at the biceps/labral anchor.\par 3.  Moderate AC joint degenerative changes.\par 4.  Mild degenerative fraying of the superior labrum as before.\par 5.  Moderate cartilage thinning at the humeral head. Small effusion.

## 2023-02-15 NOTE — DISCUSSION/SUMMARY
[de-identified] : I had a lengthy discussion with the patient regarding their current condition. We discussed the treatment options including operative and nonoperative management. At this time we discussed watchful waiting versus operative intervention.  Patient prefers operative intervention, we discussed a right shoulder diagnostic arthroscopy with potential revision rotator cuff repair, as well as open subpectoral biceps tenodesis.  The risk benefits and alternatives were discussed at length.  I will discuss his care plan with Dr. Carmona, we will potentially book him in the next month or 2 for surgical intervention.  All of his questions were answered.

## 2023-03-13 ENCOUNTER — APPOINTMENT (OUTPATIENT)
Dept: ORTHOPEDIC SURGERY | Facility: CLINIC | Age: 53
End: 2023-03-13

## 2023-03-13 ENCOUNTER — RX RENEWAL (OUTPATIENT)
Age: 53
End: 2023-03-13

## 2023-03-13 RX ORDER — MELOXICAM 15 MG/1
15 TABLET ORAL
Qty: 21 | Refills: 0 | Status: ACTIVE | COMMUNITY
Start: 2023-01-25 | End: 1900-01-01

## 2023-03-27 ENCOUNTER — APPOINTMENT (OUTPATIENT)
Dept: ORTHOPEDIC SURGERY | Facility: HOSPITAL | Age: 53
End: 2023-03-27

## 2023-04-10 ENCOUNTER — APPOINTMENT (OUTPATIENT)
Dept: ORTHOPEDIC SURGERY | Facility: CLINIC | Age: 53
End: 2023-04-10

## 2023-04-11 ENCOUNTER — RX RENEWAL (OUTPATIENT)
Age: 53
End: 2023-04-11

## 2023-04-11 RX ORDER — MELOXICAM 7.5 MG/1
7.5 TABLET ORAL DAILY
Qty: 30 | Refills: 2 | Status: ACTIVE | COMMUNITY
Start: 2022-08-25 | End: 1900-01-01

## 2023-05-10 ENCOUNTER — APPOINTMENT (OUTPATIENT)
Dept: ORTHOPEDIC SURGERY | Facility: CLINIC | Age: 53
End: 2023-05-10

## 2023-06-21 ENCOUNTER — APPOINTMENT (OUTPATIENT)
Dept: ORTHOPEDIC SURGERY | Facility: CLINIC | Age: 53
End: 2023-06-21

## 2024-01-08 ENCOUNTER — EMERGENCY (EMERGENCY)
Facility: HOSPITAL | Age: 54
LOS: 1 days | Discharge: HOME | End: 2024-01-08
Attending: EMERGENCY MEDICINE
Payer: MEDICAID

## 2024-01-08 VITALS
RESPIRATION RATE: 18 BRPM | DIASTOLIC BLOOD PRESSURE: 96 MMHG | HEIGHT: 71 IN | HEART RATE: 115 BPM | OXYGEN SATURATION: 96 % | TEMPERATURE: 98 F | SYSTOLIC BLOOD PRESSURE: 155 MMHG | WEIGHT: 203.05 LBS

## 2024-01-08 DIAGNOSIS — Z98.890 OTHER SPECIFIED POSTPROCEDURAL STATES: Chronic | ICD-10-CM

## 2024-01-08 PROCEDURE — 99285 EMERGENCY DEPT VISIT HI MDM: CPT

## 2024-01-08 PROCEDURE — 99283 EMERGENCY DEPT VISIT LOW MDM: CPT

## 2024-01-08 NOTE — ED ADULT TRIAGE NOTE - CHIEF COMPLAINT QUOTE
BIB EMS after patient hit head on slot machine.  Patient denies hitting head - reports + ETOH.  Pt alert, oriented x 3, no obvious trauma noted. Hx HLD

## 2024-01-08 NOTE — ED PROVIDER NOTE - PATIENT PORTAL LINK FT
You can access the FollowMyHealth Patient Portal offered by [facility] by registering at the following website: [email or web address]. By joining [employer]’s FollowMyHealth portal, you will also be able to view your health information using other applications (apps) compatible with our system. You can access the FollowMyHealth Patient Portal offered by [facility] by registering at the following website: [email or web address]. By joining [employer]’s FollowMyHealth portal, you will also be able to view your health information using other applications (apps) compatible with our system.

## 2024-01-08 NOTE — ED ADULT NURSE NOTE - NSFALLUNIVINTERV_ED_ALL_ED
Bed/Stretcher in lowest position, wheels locked, appropriate side rails in place/Call bell, personal items and telephone in reach/Instruct patient to call for assistance before getting out of bed/chair/stretcher/Non-slip footwear applied when patient is off stretcher/[place] to call system/Physically safe environment - no spills, clutter or unnecessary equipment/Purposeful proactive rounding/Room/bathroom lighting operational, light cord in reach Bed/Stretcher in lowest position, wheels locked, appropriate side rails in place/Call bell, personal items and telephone in reach/Instruct patient to call for assistance before getting out of bed/chair/stretcher/Non-slip footwear applied when patient is off stretcher/[place] to call system/Physically safe environment - no spills, clutter or unnecessary equipment/Purposeful proactive rounding/Room/bathroom lighting operational, light cord in reach

## 2024-01-08 NOTE — ED PROVIDER NOTE - CLINICAL SUMMARY MEDICAL DECISION MAKING FREE TEXT BOX
Is clinically sober now and is able to walk.  Patient knows his address and will take Uber.  Patient refused CT imaging or any final blood work and just wants to go home and sleep.  Patient has  at home.

## 2024-01-08 NOTE — ED ADULT NURSE NOTE - OBJECTIVE STATEMENT
assumed care of pt from triage, pt AAOX4, resp. even and unlabored on RA, pt endorses he was at [employer] with friends and "had a little too much to drink", pt endorses ETOH use, denies LOC/head strike/PMH/blood thinner use, pt denies chest pain/SOB//fever/chills/headache/dizziness/vision changes/pain, MD Jose Elias at bedside, pt ambulates w/steady gait and w/o assistance

## 2024-01-08 NOTE — ED PROVIDER NOTE - OBJECTIVE STATEMENT
53-year-old male with no medical problems presents with alcohol intoxication.  Patient was at [employer] and reportedly hit his head on the slot machine.  Patient denies hitting head.  Patient states that he wants to take Uber and go home and does not want to be here and is able to walk.  Patient admits to drinking 3 beers but is feeling fine now.  Patient has  at home

## 2024-03-22 ENCOUNTER — RESULT REVIEW (OUTPATIENT)
Age: 54
End: 2024-03-22

## 2024-03-22 ENCOUNTER — APPOINTMENT (OUTPATIENT)
Dept: RHEUMATOLOGY | Facility: CLINIC | Age: 54
End: 2024-03-22
Payer: MEDICAID

## 2024-03-22 VITALS
TEMPERATURE: 98.1 F | DIASTOLIC BLOOD PRESSURE: 98 MMHG | HEART RATE: 79 BPM | BODY MASS INDEX: 28.28 KG/M2 | WEIGHT: 202 LBS | OXYGEN SATURATION: 97 % | SYSTOLIC BLOOD PRESSURE: 135 MMHG | HEIGHT: 71 IN

## 2024-03-22 DIAGNOSIS — M54.16 RADICULOPATHY, LUMBAR REGION: ICD-10-CM

## 2024-03-22 PROCEDURE — 99204 OFFICE O/P NEW MOD 45 MIN: CPT

## 2024-03-22 RX ORDER — INDOMETHACIN 50 MG/1
50 CAPSULE ORAL 3 TIMES DAILY
Qty: 90 | Refills: 2 | Status: ACTIVE | COMMUNITY
Start: 2024-03-22 | End: 1900-01-01

## 2024-03-22 NOTE — HISTORY OF PRESENT ILLNESS
[FreeTextEntry1] : 54 y/o male referred to rheumatology for joint/muscle pains. Pt has been having b/l hand/wrist pains for several years. AM stiffness ~30 mins. Reports R>L numbness of hands more recently. Reports b/l feet numbness as well. Pt has medial and lateral elbow pains, Pt has Hx of RCT and calcific tendinitis s/p repair. 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 Pt takes meloxicam PRN for the pains Pt was seen by Dr. Vickers in 2018 for elevated RF (CCP negative), thought to have been secondary to HX of smoking rather than RA.

## 2024-03-22 NOTE — PHYSICAL EXAM
[TextEntry] : GENERAL: Appears in no acute distress  HEENT: EOMI, PERRLA. No conjunctival erythema. Moist mucous membranes. No nasopharyngeal ulcers  NECK: Supple, no cervical lymphadenopathy, no thyromegaly  CARDIOVASCULAR: RRR. S1, S2 auscultated. No murmurs or rubs.  PULMONARY: Clear to auscultation b/l, no wheezes, rales, or crackles  ABDOMINAL: Soft, nontender, nondistended. Bowel sounds present. No organomegaly.  MSK:  No active synovitis, swelling, erythema, or warmth.  Tenderness to palpation of R medial + lateral, L medial epicondyles Positive Tinel's tests of b/l median nerves of wrists No crepitus.  SKIN: No lesions or rashes  NEURO: No focal deficits.  PSYCH: AAOx3. Normal affect and thought process.

## 2024-03-22 NOTE — ASSESSMENT
[FreeTextEntry1] : 52 y/o male referred to rheumatology for joint/muscle pains. Pt has been having b/l hand/wrist pains for several years. AM stiffness ~30 mins. Reports R>L numbness of hands more recently. Reports b/l feet numbness as well. Pt has medial and lateral elbow pains, Pt has Hx of RCT and calcific tendinitis s/p repair. 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 Pt takes meloxicam PRN for the pains Pt was seen by Dr. Vickers in 2018 for elevated RF (CCP negative), thought to have been secondary to HX of smoking rather than RA.  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 Pt also likely has lumbar radiculopathy. However, pt has some possible inflammatory characteristics of joint pains with Hx of mild positive RF.   - Obtain labwork to evaluate for signs of inflammatory arthritis. - Obtain XR b/l hands, wrists, C-spine, L-spine - Obtain US b/l hands/wrists to evaluate for signs of inflammatory arthritis - Rx indomethacin 50mg PO TID PRN. I advised that the NSAID should be taken with food.  In addition while taking the prescribed NSAID, no over the counter or other NSAIDs should be used, such as ibuprofen (Motrin or Advil) or naproxen (Aleve) as this can cause stomach upset or other side effects.  If needed for fever or breakthrough pain Tylenol can be used. - Pt also takes cyclobenzaprine PRN - Pt to let me know if considering injections/procedures for refractory pains to ortho hand (for CTS) vs. ortho spine (for lumbar radiculopathy). - Can consider b/l elbows epicondylitis injections if refractory to above conservative therapy. - Will contact pt for clear signs of inflammatory arthritis on workup. RTC 2 months for follow up

## 2024-03-29 ENCOUNTER — APPOINTMENT (OUTPATIENT)
Dept: RADIOLOGY | Facility: CLINIC | Age: 54
End: 2024-03-29
Payer: MEDICAID

## 2024-03-29 ENCOUNTER — OUTPATIENT (OUTPATIENT)
Dept: OUTPATIENT SERVICES | Facility: HOSPITAL | Age: 54
LOS: 1 days | End: 2024-03-29
Payer: MEDICAID

## 2024-03-29 DIAGNOSIS — Z98.890 OTHER SPECIFIED POSTPROCEDURAL STATES: Chronic | ICD-10-CM

## 2024-03-29 DIAGNOSIS — M25.50 PAIN IN UNSPECIFIED JOINT: ICD-10-CM

## 2024-03-29 PROCEDURE — 72040 X-RAY EXAM NECK SPINE 2-3 VW: CPT | Mod: 26

## 2024-03-29 PROCEDURE — 72100 X-RAY EXAM L-S SPINE 2/3 VWS: CPT | Mod: 26

## 2024-03-29 PROCEDURE — 73120 X-RAY EXAM OF HAND: CPT | Mod: 26,50

## 2024-03-29 PROCEDURE — 73100 X-RAY EXAM OF WRIST: CPT

## 2024-03-29 PROCEDURE — 73100 X-RAY EXAM OF WRIST: CPT | Mod: 26,50

## 2024-03-29 PROCEDURE — 73120 X-RAY EXAM OF HAND: CPT

## 2024-03-29 PROCEDURE — 72100 X-RAY EXAM L-S SPINE 2/3 VWS: CPT

## 2024-03-29 PROCEDURE — 72040 X-RAY EXAM NECK SPINE 2-3 VW: CPT

## 2024-04-08 ENCOUNTER — APPOINTMENT (OUTPATIENT)
Dept: RHEUMATOLOGY | Facility: CLINIC | Age: 54
End: 2024-04-08

## 2024-06-07 ENCOUNTER — APPOINTMENT (OUTPATIENT)
Dept: RHEUMATOLOGY | Facility: CLINIC | Age: 54
End: 2024-06-07
Payer: MEDICAID

## 2024-06-07 VITALS — OXYGEN SATURATION: 98 % | SYSTOLIC BLOOD PRESSURE: 131 MMHG | HEART RATE: 83 BPM | DIASTOLIC BLOOD PRESSURE: 89 MMHG

## 2024-06-07 DIAGNOSIS — M77.11 LATERAL EPICONDYLITIS, RIGHT ELBOW: ICD-10-CM

## 2024-06-07 DIAGNOSIS — M25.50 PAIN IN UNSPECIFIED JOINT: ICD-10-CM

## 2024-06-07 DIAGNOSIS — R53.82 CHRONIC FATIGUE, UNSPECIFIED: ICD-10-CM

## 2024-06-07 DIAGNOSIS — R76.8 OTHER SPECIFIED ABNORMAL IMMUNOLOGICAL FINDINGS IN SERUM: ICD-10-CM

## 2024-06-07 PROCEDURE — 99214 OFFICE O/P EST MOD 30 MIN: CPT

## 2024-06-07 NOTE — ASSESSMENT
[FreeTextEntry1] : 52 y/o male presents as follow up for joint/muscle pains.  Pt has been having b/l hand/wrist pains for several years. AM stiffness ~30 mins. Reports R>L numbness of hands more recently. Reports b/l feet numbness as well.  Pt has medial and lateral elbow pains,  Pt has Hx of RCT and calcific tendinitis s/p repair.  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  Pt takes meloxicam PRN for the pains  Pt was seen by Dr. Vickers in 2018 for elevated RF (CCP negative), thought to have been secondary to HX of smoking rather than RA.   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 Pt also likely has lumbar radiculopathy. However, pt has some possible inflammatory characteristics of joint pains with Hx of mild positive RF.   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 Pt has not had labwork and ultrasound of b/l hands/wrists ordered by me done.  - Obtain labwork to evaluate for signs of inflammatory arthritis.  - Obtain US b/l hands/wrists to evaluate for signs of inflammatory arthritis  - c/w indomethacin 50mg PO TID PRN. I advised that the NSAID should be taken with food. In addition while taking the prescribed NSAID, no over the counter or other NSAIDs should be used, such as ibuprofen (Motrin or Advil) or naproxen (Aleve) as this can cause stomach upset or other side effects. If needed for fever or breakthrough pain Tylenol can be used.  - Pt also takes cyclobenzaprine PRN  - Advised to consider follow up with pain management of spinal arthritis and possible trigger point injections. - Will contact pt with labwork and US results. If no signs of inflammatory arthritis (other than known positive RF), I believe pt's pains are mechanical and pt does not need to follow up with rheumatology regularly.

## 2024-06-07 NOTE — HISTORY OF PRESENT ILLNESS
[FreeTextEntry1] : HISTORY:  52 y/o male presents as follow up for joint/muscle pains.  Pt has been having b/l hand/wrist pains for several years. AM stiffness ~30 mins. Reports R>L numbness of hands more recently. Reports b/l feet numbness as well.  Pt has medial and lateral elbow pains,  Pt has Hx of RCT and calcific tendinitis s/p repair.  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  Pt takes meloxicam PRN for the pains  Pt was seen by Dr. Vickers in 2018 for elevated RF (CCP negative), thought to have been secondary to HX of smoking rather than RA.   INTERVAL HISTORY:  Pt had XRs done showing DJD of C-spine and L-spine with suggestion of muscle spasm - likely related to his job as a  standing all day. Pt has not had labwork and ultrasound of b/l hands/wrists ordered by me done.  WORKUP:  XR b/l hands/wrists (3/2024): Old right 5th and left 2nd metacarpal fracture deformities.  XR C-spine (3/2024): Broad levo convexity of the lumbar spine. Straightening of the lumbar lordosis. Trace retrolisthesis of C5 on C6, unchanged. Loss of intervertebral disc height at C5-C6 and C6/C7 with endplate osteophyte formation. There is multilevel facet arthrosis  XR L-spine (3/2024): Straightening of the lumbar lordosis. Preservation of the vertebral body heights. Mild retrolisthesis of L5 on S1. Loss of intervertebral disc height posteriorly at L5/S1. Sacroiliac joints are intact. Hip joint spaces are maintained.

## 2024-07-11 ENCOUNTER — RESULT REVIEW (OUTPATIENT)
Age: 54
End: 2024-07-11

## 2024-07-11 ENCOUNTER — OUTPATIENT (OUTPATIENT)
Dept: OUTPATIENT SERVICES | Facility: HOSPITAL | Age: 54
LOS: 1 days | End: 2024-07-11
Payer: MEDICAID

## 2024-07-11 ENCOUNTER — APPOINTMENT (OUTPATIENT)
Dept: ULTRASOUND IMAGING | Facility: CLINIC | Age: 54
End: 2024-07-11
Payer: MEDICAID

## 2024-07-11 DIAGNOSIS — Z98.890 OTHER SPECIFIED POSTPROCEDURAL STATES: Chronic | ICD-10-CM

## 2024-07-11 DIAGNOSIS — M25.50 PAIN IN UNSPECIFIED JOINT: ICD-10-CM

## 2024-07-11 PROCEDURE — 76881 US COMPL JOINT R-T W/IMG: CPT

## 2024-07-11 PROCEDURE — 76881 US COMPL JOINT R-T W/IMG: CPT | Mod: 26,RT

## 2024-07-11 PROCEDURE — 76881 US COMPL JOINT R-T W/IMG: CPT | Mod: 26,LT

## 2024-08-01 ENCOUNTER — RX RENEWAL (OUTPATIENT)
Age: 54
End: 2024-08-01

## 2024-10-30 ENCOUNTER — RX RENEWAL (OUTPATIENT)
Age: 54
End: 2024-10-30

## 2024-12-10 ENCOUNTER — APPOINTMENT (OUTPATIENT)
Dept: DERMATOLOGY | Facility: CLINIC | Age: 54
End: 2024-12-10
Payer: MEDICAID

## 2024-12-10 PROCEDURE — 99203 OFFICE O/P NEW LOW 30 MIN: CPT

## 2024-12-18 ENCOUNTER — APPOINTMENT (OUTPATIENT)
Dept: UROLOGY | Facility: CLINIC | Age: 54
End: 2024-12-18
Payer: MEDICAID

## 2024-12-18 VITALS
RESPIRATION RATE: 16 BRPM | DIASTOLIC BLOOD PRESSURE: 74 MMHG | HEIGHT: 71 IN | BODY MASS INDEX: 26.32 KG/M2 | SYSTOLIC BLOOD PRESSURE: 109 MMHG | WEIGHT: 188 LBS | HEART RATE: 84 BPM

## 2024-12-18 DIAGNOSIS — N40.1 BENIGN PROSTATIC HYPERPLASIA WITH LOWER URINARY TRACT SYMPMS: ICD-10-CM

## 2024-12-18 DIAGNOSIS — N13.8 BENIGN PROSTATIC HYPERPLASIA WITH LOWER URINARY TRACT SYMPMS: ICD-10-CM

## 2024-12-18 DIAGNOSIS — R35.1 NOCTURIA: ICD-10-CM

## 2024-12-18 PROCEDURE — 51798 US URINE CAPACITY MEASURE: CPT

## 2024-12-18 PROCEDURE — 99204 OFFICE O/P NEW MOD 45 MIN: CPT | Mod: 25

## 2024-12-18 RX ORDER — TAMSULOSIN HYDROCHLORIDE 0.4 MG/1
0.4 CAPSULE ORAL
Qty: 30 | Refills: 3 | Status: ACTIVE | COMMUNITY
Start: 2024-12-18 | End: 1900-01-01

## 2024-12-20 LAB
APPEARANCE: CLEAR
BACTERIA UR CULT: NORMAL
BACTERIA: NEGATIVE /HPF
BILIRUBIN URINE: NEGATIVE
BLOOD URINE: NEGATIVE
CAST: 0 /LPF
COLOR: YELLOW
EPITHELIAL CELLS: 0 /HPF
GLUCOSE QUALITATIVE U: NEGATIVE MG/DL
KETONES URINE: NEGATIVE MG/DL
LEUKOCYTE ESTERASE URINE: NEGATIVE
MICROSCOPIC-UA: NORMAL
NITRITE URINE: NEGATIVE
PH URINE: 7
PROTEIN URINE: NEGATIVE MG/DL
RED BLOOD CELLS URINE: 1 /HPF
SPECIFIC GRAVITY URINE: 1.02
UROBILINOGEN URINE: 0.2 MG/DL
WHITE BLOOD CELLS URINE: 0 /HPF

## 2024-12-24 PROBLEM — F10.90 ALCOHOL USE: Status: ACTIVE | Noted: 2019-10-21

## 2025-03-17 ENCOUNTER — APPOINTMENT (OUTPATIENT)
Dept: UROLOGY | Facility: CLINIC | Age: 55
End: 2025-03-17
Payer: MEDICAID

## 2025-03-17 VITALS
HEART RATE: 97 BPM | BODY MASS INDEX: 28.35 KG/M2 | DIASTOLIC BLOOD PRESSURE: 79 MMHG | OXYGEN SATURATION: 98 % | RESPIRATION RATE: 16 BRPM | WEIGHT: 198 LBS | SYSTOLIC BLOOD PRESSURE: 124 MMHG | HEIGHT: 70 IN

## 2025-03-17 DIAGNOSIS — R35.1 NOCTURIA: ICD-10-CM

## 2025-03-17 DIAGNOSIS — N13.8 BENIGN PROSTATIC HYPERPLASIA WITH LOWER URINARY TRACT SYMPMS: ICD-10-CM

## 2025-03-17 DIAGNOSIS — N40.1 BENIGN PROSTATIC HYPERPLASIA WITH LOWER URINARY TRACT SYMPMS: ICD-10-CM

## 2025-03-17 PROCEDURE — 99213 OFFICE O/P EST LOW 20 MIN: CPT

## 2025-03-17 RX ORDER — OXYBUTYNIN CHLORIDE 5 MG/1
5 TABLET ORAL
Qty: 90 | Refills: 3 | Status: ACTIVE | COMMUNITY
Start: 2025-03-17 | End: 1900-01-01

## 2025-03-31 ENCOUNTER — RX RENEWAL (OUTPATIENT)
Age: 55
End: 2025-03-31

## 2025-04-30 ENCOUNTER — RX RENEWAL (OUTPATIENT)
Age: 55
End: 2025-04-30